# Patient Record
Sex: FEMALE | Race: WHITE | NOT HISPANIC OR LATINO | Employment: UNEMPLOYED | URBAN - METROPOLITAN AREA
[De-identification: names, ages, dates, MRNs, and addresses within clinical notes are randomized per-mention and may not be internally consistent; named-entity substitution may affect disease eponyms.]

---

## 2022-04-21 RX ORDER — CIPROFLOXACIN 500 MG/1
500 TABLET, FILM COATED ORAL 2 TIMES DAILY
COMMUNITY

## 2022-04-21 RX ORDER — IBUPROFEN 400 MG/1
400 TABLET ORAL EVERY 6 HOURS PRN
COMMUNITY

## 2022-04-21 RX ORDER — GLIPIZIDE 5 MG/1
5 TABLET ORAL
COMMUNITY

## 2022-04-21 RX ORDER — LOSARTAN POTASSIUM 50 MG/1
50 TABLET ORAL DAILY
COMMUNITY

## 2022-04-22 ENCOUNTER — APPOINTMENT (OUTPATIENT)
Dept: GENERAL RADIOLOGY | Facility: HOSPITAL | Age: 67
End: 2022-04-22

## 2022-04-22 ENCOUNTER — ANESTHESIA EVENT (OUTPATIENT)
Dept: PERIOP | Facility: HOSPITAL | Age: 67
End: 2022-04-22

## 2022-04-22 ENCOUNTER — HOSPITAL ENCOUNTER (OUTPATIENT)
Facility: HOSPITAL | Age: 67
Discharge: HOME | End: 2022-04-23
Attending: UROLOGY | Admitting: UROLOGY

## 2022-04-22 ENCOUNTER — ANESTHESIA (OUTPATIENT)
Dept: PERIOP | Facility: HOSPITAL | Age: 67
End: 2022-04-22

## 2022-04-22 DIAGNOSIS — N20.1 URETERAL STONE: ICD-10-CM

## 2022-04-22 DIAGNOSIS — N20.1 URETERAL CALCULUS, LEFT: Primary | ICD-10-CM

## 2022-04-22 PROBLEM — M54.12 CERVICAL RADICULOPATHY: Status: ACTIVE | Noted: 2021-11-12

## 2022-04-22 PROBLEM — M54.2 NECK PAIN: Status: ACTIVE | Noted: 2021-11-12

## 2022-04-22 PROBLEM — E11.9 DIABETES MELLITUS: Status: ACTIVE | Noted: 2022-04-22

## 2022-04-22 PROBLEM — S42.253A CLOSED FRACTURE OF GREATER TUBEROSITY OF HUMERUS: Status: ACTIVE | Noted: 2019-08-16

## 2022-04-22 PROBLEM — M25.519 SHOULDER PAIN: Status: ACTIVE | Noted: 2019-08-16

## 2022-04-22 PROBLEM — I10 HYPERTENSION: Status: ACTIVE | Noted: 2022-04-22

## 2022-04-22 PROBLEM — J40 BRONCHITIS: Status: ACTIVE | Noted: 2022-04-22

## 2022-04-22 LAB
ANION GAP SERPL CALCULATED.3IONS-SCNC: 11.8 MMOL/L (ref 5–15)
BASOPHILS # BLD AUTO: 0.02 10*3/MM3 (ref 0–0.2)
BASOPHILS NFR BLD AUTO: 0.4 % (ref 0–1.5)
BUN SERPL-MCNC: 11 MG/DL (ref 8–23)
BUN/CREAT SERPL: 17.5 (ref 7–25)
CALCIUM SPEC-SCNC: 9.2 MG/DL (ref 8.6–10.5)
CHLORIDE SERPL-SCNC: 106 MMOL/L (ref 98–107)
CO2 SERPL-SCNC: 23.2 MMOL/L (ref 22–29)
CREAT SERPL-MCNC: 0.63 MG/DL (ref 0.57–1)
DEPRECATED RDW RBC AUTO: 44.4 FL (ref 37–54)
EGFRCR SERPLBLD CKD-EPI 2021: 97.4 ML/MIN/1.73
EOSINOPHIL # BLD AUTO: 0.36 10*3/MM3 (ref 0–0.4)
EOSINOPHIL NFR BLD AUTO: 6.7 % (ref 0.3–6.2)
ERYTHROCYTE [DISTWIDTH] IN BLOOD BY AUTOMATED COUNT: 14.3 % (ref 12.3–15.4)
GLUCOSE SERPL-MCNC: 87 MG/DL (ref 65–99)
HCT VFR BLD AUTO: 42.1 % (ref 34–46.6)
HGB BLD-MCNC: 13.4 G/DL (ref 12–15.9)
IMM GRANULOCYTES # BLD AUTO: 0.02 10*3/MM3 (ref 0–0.05)
IMM GRANULOCYTES NFR BLD AUTO: 0.4 % (ref 0–0.5)
LYMPHOCYTES # BLD AUTO: 1.28 10*3/MM3 (ref 0.7–3.1)
LYMPHOCYTES NFR BLD AUTO: 23.8 % (ref 19.6–45.3)
MCH RBC QN AUTO: 27 PG (ref 26.6–33)
MCHC RBC AUTO-ENTMCNC: 31.8 G/DL (ref 31.5–35.7)
MCV RBC AUTO: 84.9 FL (ref 79–97)
MONOCYTES # BLD AUTO: 0.34 10*3/MM3 (ref 0.1–0.9)
MONOCYTES NFR BLD AUTO: 6.3 % (ref 5–12)
NEUTROPHILS NFR BLD AUTO: 3.35 10*3/MM3 (ref 1.7–7)
NEUTROPHILS NFR BLD AUTO: 62.4 % (ref 42.7–76)
NRBC BLD AUTO-RTO: 0 /100 WBC (ref 0–0.2)
PLATELET # BLD AUTO: 148 10*3/MM3 (ref 140–450)
PMV BLD AUTO: 12.3 FL (ref 6–12)
POTASSIUM SERPL-SCNC: 4.2 MMOL/L (ref 3.5–5.2)
RBC # BLD AUTO: 4.96 10*6/MM3 (ref 3.77–5.28)
SARS-COV-2 RNA PNL SPEC NAA+PROBE: NOT DETECTED
SODIUM SERPL-SCNC: 141 MMOL/L (ref 136–145)
WBC NRBC COR # BLD: 5.37 10*3/MM3 (ref 3.4–10.8)

## 2022-04-22 PROCEDURE — C2617 STENT, NON-COR, TEM W/O DEL: HCPCS | Performed by: UROLOGY

## 2022-04-22 PROCEDURE — 74420 UROGRAPHY RTRGR +-KUB: CPT

## 2022-04-22 PROCEDURE — C9803 HOPD COVID-19 SPEC COLLECT: HCPCS

## 2022-04-22 PROCEDURE — 25010000002 CEFAZOLIN IN DEXTROSE 2-4 GM/100ML-% SOLUTION: Performed by: UROLOGY

## 2022-04-22 PROCEDURE — C1769 GUIDE WIRE: HCPCS | Performed by: UROLOGY

## 2022-04-22 PROCEDURE — 25010000002 HYDROMORPHONE PER 4 MG: Performed by: ANESTHESIOLOGY

## 2022-04-22 PROCEDURE — G0378 HOSPITAL OBSERVATION PER HR: HCPCS

## 2022-04-22 PROCEDURE — 0 IOTHALAMATE 60 % SOLUTION: Performed by: UROLOGY

## 2022-04-22 PROCEDURE — 82365 CALCULUS SPECTROSCOPY: CPT | Performed by: UROLOGY

## 2022-04-22 PROCEDURE — 25010000002 FENTANYL CITRATE (PF) 50 MCG/ML SOLUTION: Performed by: ANESTHESIOLOGY

## 2022-04-22 PROCEDURE — C1758 CATHETER, URETERAL: HCPCS | Performed by: UROLOGY

## 2022-04-22 PROCEDURE — 25010000002 PROPOFOL 10 MG/ML EMULSION: Performed by: ANESTHESIOLOGY

## 2022-04-22 PROCEDURE — 25010000002 KETOROLAC TROMETHAMINE PER 15 MG: Performed by: ANESTHESIOLOGY

## 2022-04-22 PROCEDURE — 87635 SARS-COV-2 COVID-19 AMP PRB: CPT | Performed by: UROLOGY

## 2022-04-22 PROCEDURE — 25010000002 MIDAZOLAM PER 1 MG: Performed by: ANESTHESIOLOGY

## 2022-04-22 PROCEDURE — 85025 COMPLETE CBC W/AUTO DIFF WBC: CPT | Performed by: UROLOGY

## 2022-04-22 PROCEDURE — 80048 BASIC METABOLIC PNL TOTAL CA: CPT | Performed by: UROLOGY

## 2022-04-22 DEVICE — URETERAL STENT
Type: IMPLANTABLE DEVICE | Site: URETER | Status: FUNCTIONAL
Brand: CONTOUR™

## 2022-04-22 RX ORDER — ONDANSETRON 4 MG/1
4 TABLET, FILM COATED ORAL EVERY 6 HOURS PRN
Status: DISCONTINUED | OUTPATIENT
Start: 2022-04-22 | End: 2022-04-23 | Stop reason: HOSPADM

## 2022-04-22 RX ORDER — LABETALOL HYDROCHLORIDE 5 MG/ML
5 INJECTION, SOLUTION INTRAVENOUS
Status: DISCONTINUED | OUTPATIENT
Start: 2022-04-22 | End: 2022-04-22 | Stop reason: HOSPADM

## 2022-04-22 RX ORDER — NALOXONE HCL 0.4 MG/ML
0.1 VIAL (ML) INJECTION
Status: DISCONTINUED | OUTPATIENT
Start: 2022-04-22 | End: 2022-04-23 | Stop reason: HOSPADM

## 2022-04-22 RX ORDER — EPHEDRINE SULFATE 50 MG/ML
5 INJECTION, SOLUTION INTRAVENOUS ONCE AS NEEDED
Status: DISCONTINUED | OUTPATIENT
Start: 2022-04-22 | End: 2022-04-22 | Stop reason: HOSPADM

## 2022-04-22 RX ORDER — OXYCODONE HYDROCHLORIDE AND ACETAMINOPHEN 5; 325 MG/1; MG/1
1 TABLET ORAL EVERY 4 HOURS PRN
Qty: 20 TABLET | Refills: 0 | Status: SHIPPED | OUTPATIENT
Start: 2022-04-22

## 2022-04-22 RX ORDER — SODIUM CHLORIDE 0.9 % (FLUSH) 0.9 %
3 SYRINGE (ML) INJECTION EVERY 12 HOURS SCHEDULED
Status: DISCONTINUED | OUTPATIENT
Start: 2022-04-22 | End: 2022-04-22 | Stop reason: HOSPADM

## 2022-04-22 RX ORDER — HYDROCODONE BITARTRATE AND ACETAMINOPHEN 7.5; 325 MG/1; MG/1
1 TABLET ORAL ONCE AS NEEDED
Status: DISCONTINUED | OUTPATIENT
Start: 2022-04-22 | End: 2022-04-22 | Stop reason: HOSPADM

## 2022-04-22 RX ORDER — DIPHENHYDRAMINE HYDROCHLORIDE 50 MG/ML
12.5 INJECTION INTRAMUSCULAR; INTRAVENOUS
Status: DISCONTINUED | OUTPATIENT
Start: 2022-04-22 | End: 2022-04-22 | Stop reason: HOSPADM

## 2022-04-22 RX ORDER — LEVOFLOXACIN 750 MG/1
750 TABLET ORAL
Status: DISCONTINUED | OUTPATIENT
Start: 2022-04-23 | End: 2022-04-23 | Stop reason: HOSPADM

## 2022-04-22 RX ORDER — SODIUM CHLORIDE 0.9 % (FLUSH) 0.9 %
3 SYRINGE (ML) INJECTION EVERY 12 HOURS SCHEDULED
Status: DISCONTINUED | OUTPATIENT
Start: 2022-04-22 | End: 2022-04-23 | Stop reason: HOSPADM

## 2022-04-22 RX ORDER — MIDAZOLAM HYDROCHLORIDE 1 MG/ML
0.5 INJECTION INTRAMUSCULAR; INTRAVENOUS
Status: COMPLETED | OUTPATIENT
Start: 2022-04-22 | End: 2022-04-22

## 2022-04-22 RX ORDER — DIPHENHYDRAMINE HCL 25 MG
25 CAPSULE ORAL
Status: DISCONTINUED | OUTPATIENT
Start: 2022-04-22 | End: 2022-04-22 | Stop reason: HOSPADM

## 2022-04-22 RX ORDER — GLIPIZIDE 5 MG/1
5 TABLET ORAL
Status: DISCONTINUED | OUTPATIENT
Start: 2022-04-23 | End: 2022-04-23 | Stop reason: HOSPADM

## 2022-04-22 RX ORDER — MAGNESIUM HYDROXIDE 1200 MG/15ML
LIQUID ORAL AS NEEDED
Status: DISCONTINUED | OUTPATIENT
Start: 2022-04-22 | End: 2022-04-22 | Stop reason: HOSPADM

## 2022-04-22 RX ORDER — PROMETHAZINE HYDROCHLORIDE 25 MG/1
25 SUPPOSITORY RECTAL ONCE AS NEEDED
Status: DISCONTINUED | OUTPATIENT
Start: 2022-04-22 | End: 2022-04-22 | Stop reason: HOSPADM

## 2022-04-22 RX ORDER — OXYCODONE HYDROCHLORIDE AND ACETAMINOPHEN 5; 325 MG/1; MG/1
1 TABLET ORAL EVERY 4 HOURS PRN
Status: DISCONTINUED | OUTPATIENT
Start: 2022-04-22 | End: 2022-04-23 | Stop reason: HOSPADM

## 2022-04-22 RX ORDER — PROMETHAZINE HYDROCHLORIDE 25 MG/1
25 TABLET ORAL ONCE AS NEEDED
Status: DISCONTINUED | OUTPATIENT
Start: 2022-04-22 | End: 2022-04-22 | Stop reason: HOSPADM

## 2022-04-22 RX ORDER — SODIUM CHLORIDE 0.9 % (FLUSH) 0.9 %
3-10 SYRINGE (ML) INJECTION AS NEEDED
Status: DISCONTINUED | OUTPATIENT
Start: 2022-04-22 | End: 2022-04-22 | Stop reason: HOSPADM

## 2022-04-22 RX ORDER — ROCURONIUM BROMIDE 10 MG/ML
INJECTION, SOLUTION INTRAVENOUS AS NEEDED
Status: DISCONTINUED | OUTPATIENT
Start: 2022-04-22 | End: 2022-04-22 | Stop reason: SURG

## 2022-04-22 RX ORDER — FENTANYL CITRATE 50 UG/ML
50 INJECTION, SOLUTION INTRAMUSCULAR; INTRAVENOUS
Status: DISCONTINUED | OUTPATIENT
Start: 2022-04-22 | End: 2022-04-22 | Stop reason: HOSPADM

## 2022-04-22 RX ORDER — SODIUM CHLORIDE 0.9 % (FLUSH) 0.9 %
10 SYRINGE (ML) INJECTION AS NEEDED
Status: DISCONTINUED | OUTPATIENT
Start: 2022-04-22 | End: 2022-04-23 | Stop reason: HOSPADM

## 2022-04-22 RX ORDER — NALOXONE HCL 0.4 MG/ML
0.2 VIAL (ML) INJECTION AS NEEDED
Status: DISCONTINUED | OUTPATIENT
Start: 2022-04-22 | End: 2022-04-22 | Stop reason: HOSPADM

## 2022-04-22 RX ORDER — LOSARTAN POTASSIUM 50 MG/1
50 TABLET ORAL DAILY
Status: DISCONTINUED | OUTPATIENT
Start: 2022-04-23 | End: 2022-04-23 | Stop reason: HOSPADM

## 2022-04-22 RX ORDER — SODIUM CHLORIDE 9 MG/ML
50 INJECTION, SOLUTION INTRAVENOUS CONTINUOUS
Status: DISCONTINUED | OUTPATIENT
Start: 2022-04-22 | End: 2022-04-23 | Stop reason: HOSPADM

## 2022-04-22 RX ORDER — OXYCODONE AND ACETAMINOPHEN 7.5; 325 MG/1; MG/1
1 TABLET ORAL EVERY 4 HOURS PRN
Status: DISCONTINUED | OUTPATIENT
Start: 2022-04-22 | End: 2022-04-22 | Stop reason: HOSPADM

## 2022-04-22 RX ORDER — HYDROMORPHONE HYDROCHLORIDE 1 MG/ML
0.5 INJECTION, SOLUTION INTRAMUSCULAR; INTRAVENOUS; SUBCUTANEOUS
Status: DISCONTINUED | OUTPATIENT
Start: 2022-04-22 | End: 2022-04-22 | Stop reason: HOSPADM

## 2022-04-22 RX ORDER — CEFAZOLIN SODIUM 2 G/100ML
2 INJECTION, SOLUTION INTRAVENOUS ONCE
Status: COMPLETED | OUTPATIENT
Start: 2022-04-22 | End: 2022-04-22

## 2022-04-22 RX ORDER — HYDROMORPHONE HYDROCHLORIDE 1 MG/ML
0.5 INJECTION, SOLUTION INTRAMUSCULAR; INTRAVENOUS; SUBCUTANEOUS
Status: DISCONTINUED | OUTPATIENT
Start: 2022-04-22 | End: 2022-04-23 | Stop reason: HOSPADM

## 2022-04-22 RX ORDER — FAMOTIDINE 10 MG/ML
20 INJECTION, SOLUTION INTRAVENOUS ONCE
Status: COMPLETED | OUTPATIENT
Start: 2022-04-22 | End: 2022-04-22

## 2022-04-22 RX ORDER — SODIUM CHLORIDE, SODIUM LACTATE, POTASSIUM CHLORIDE, CALCIUM CHLORIDE 600; 310; 30; 20 MG/100ML; MG/100ML; MG/100ML; MG/100ML
9 INJECTION, SOLUTION INTRAVENOUS CONTINUOUS
Status: DISCONTINUED | OUTPATIENT
Start: 2022-04-22 | End: 2022-04-22

## 2022-04-22 RX ORDER — IBUPROFEN 600 MG/1
600 TABLET ORAL ONCE AS NEEDED
Status: DISCONTINUED | OUTPATIENT
Start: 2022-04-22 | End: 2022-04-22 | Stop reason: HOSPADM

## 2022-04-22 RX ORDER — LIDOCAINE HYDROCHLORIDE 20 MG/ML
INJECTION, SOLUTION INFILTRATION; PERINEURAL AS NEEDED
Status: DISCONTINUED | OUTPATIENT
Start: 2022-04-22 | End: 2022-04-22 | Stop reason: SURG

## 2022-04-22 RX ORDER — LIDOCAINE HYDROCHLORIDE 10 MG/ML
0.5 INJECTION, SOLUTION EPIDURAL; INFILTRATION; INTRACAUDAL; PERINEURAL ONCE AS NEEDED
Status: DISCONTINUED | OUTPATIENT
Start: 2022-04-22 | End: 2022-04-22 | Stop reason: HOSPADM

## 2022-04-22 RX ORDER — PROPOFOL 10 MG/ML
VIAL (ML) INTRAVENOUS AS NEEDED
Status: DISCONTINUED | OUTPATIENT
Start: 2022-04-22 | End: 2022-04-22 | Stop reason: SURG

## 2022-04-22 RX ORDER — ONDANSETRON 2 MG/ML
4 INJECTION INTRAMUSCULAR; INTRAVENOUS EVERY 6 HOURS PRN
Status: DISCONTINUED | OUTPATIENT
Start: 2022-04-22 | End: 2022-04-23 | Stop reason: HOSPADM

## 2022-04-22 RX ORDER — OXYCODONE HYDROCHLORIDE AND ACETAMINOPHEN 5; 325 MG/1; MG/1
1 TABLET ORAL ONCE AS NEEDED
Status: DISCONTINUED | OUTPATIENT
Start: 2022-04-22 | End: 2022-04-22 | Stop reason: HOSPADM

## 2022-04-22 RX ORDER — ONDANSETRON 2 MG/ML
4 INJECTION INTRAMUSCULAR; INTRAVENOUS ONCE AS NEEDED
Status: DISCONTINUED | OUTPATIENT
Start: 2022-04-22 | End: 2022-04-22 | Stop reason: HOSPADM

## 2022-04-22 RX ORDER — PHENAZOPYRIDINE HYDROCHLORIDE 200 MG/1
200 TABLET, FILM COATED ORAL 3 TIMES DAILY PRN
Qty: 30 TABLET | Refills: 0 | Status: SHIPPED | OUTPATIENT
Start: 2022-04-22

## 2022-04-22 RX ORDER — HYDRALAZINE HYDROCHLORIDE 20 MG/ML
5 INJECTION INTRAMUSCULAR; INTRAVENOUS
Status: DISCONTINUED | OUTPATIENT
Start: 2022-04-22 | End: 2022-04-22 | Stop reason: HOSPADM

## 2022-04-22 RX ORDER — FLUMAZENIL 0.1 MG/ML
0.2 INJECTION INTRAVENOUS AS NEEDED
Status: DISCONTINUED | OUTPATIENT
Start: 2022-04-22 | End: 2022-04-22 | Stop reason: HOSPADM

## 2022-04-22 RX ORDER — KETOROLAC TROMETHAMINE 15 MG/ML
15 INJECTION, SOLUTION INTRAMUSCULAR; INTRAVENOUS ONCE
Status: COMPLETED | OUTPATIENT
Start: 2022-04-22 | End: 2022-04-22

## 2022-04-22 RX ADMIN — KETOROLAC TROMETHAMINE 15 MG: 15 INJECTION, SOLUTION INTRAMUSCULAR; INTRAVENOUS at 20:30

## 2022-04-22 RX ADMIN — FAMOTIDINE 20 MG: 10 INJECTION INTRAVENOUS at 15:54

## 2022-04-22 RX ADMIN — HYDROMORPHONE HYDROCHLORIDE 0.5 MG: 1 INJECTION, SOLUTION INTRAMUSCULAR; INTRAVENOUS; SUBCUTANEOUS at 19:13

## 2022-04-22 RX ADMIN — SODIUM CHLORIDE, POTASSIUM CHLORIDE, SODIUM LACTATE AND CALCIUM CHLORIDE 9 ML/HR: 600; 310; 30; 20 INJECTION, SOLUTION INTRAVENOUS at 15:54

## 2022-04-22 RX ADMIN — CEFAZOLIN SODIUM 2 G: 2 INJECTION, SOLUTION INTRAVENOUS at 17:54

## 2022-04-22 RX ADMIN — FENTANYL CITRATE 50 MCG: 50 INJECTION, SOLUTION INTRAMUSCULAR; INTRAVENOUS at 18:49

## 2022-04-22 RX ADMIN — ROCURONIUM BROMIDE 5 MG: 50 INJECTION INTRAVENOUS at 18:05

## 2022-04-22 RX ADMIN — FENTANYL CITRATE 50 MCG: 50 INJECTION, SOLUTION INTRAMUSCULAR; INTRAVENOUS at 19:22

## 2022-04-22 RX ADMIN — MIDAZOLAM 0.5 MG: 1 INJECTION INTRAMUSCULAR; INTRAVENOUS at 17:24

## 2022-04-22 RX ADMIN — OXYCODONE AND ACETAMINOPHEN 1 TABLET: 5; 325 TABLET ORAL at 19:13

## 2022-04-22 RX ADMIN — PROPOFOL 200 MG: 10 INJECTION, EMULSION INTRAVENOUS at 18:06

## 2022-04-22 RX ADMIN — MIDAZOLAM 0.5 MG: 1 INJECTION INTRAMUSCULAR; INTRAVENOUS at 15:54

## 2022-04-22 RX ADMIN — LIDOCAINE HYDROCHLORIDE 100 MG: 20 INJECTION, SOLUTION INFILTRATION; PERINEURAL at 18:06

## 2022-04-22 NOTE — ANESTHESIA PROCEDURE NOTES
Airway  Urgency: elective    Date/Time: 4/22/2022 6:09 PM  End Time:4/22/2022 6:09 PM  Airway not difficult    General Information and Staff    Patient location during procedure: OR  Anesthesiologist: Wilbert Sloan MD    Indications and Patient Condition  Indications for airway management: airway protection    Preoxygenated: yes  Mask difficulty assessment: 0 - not attempted    Final Airway Details  Final airway type: supraglottic airway      Successful airway: classic  Size 4    Number of attempts at approach: 2  Assessment: lips, teeth, and gum same as pre-op and atraumatic intubation

## 2022-04-22 NOTE — ANESTHESIA PREPROCEDURE EVALUATION
Anesthesia Evaluation     Patient summary reviewed and Nursing notes reviewed   no history of anesthetic complications:               Airway   Mallampati: II  TM distance: >3 FB  Neck ROM: full  no difficulty expected  Dental - normal exam     Pulmonary     breath sounds clear to auscultation  (+) asthma,  (-) shortness of breath, sleep apnea, decreased breath sounds, wheezes  Cardiovascular - normal exam  Exercise tolerance: good (4-7 METS)    Rhythm: regular  Rate: normal    (+) hypertension,   (-) past MI, angina, CHF, orthopnea, PND, SEPULVEDA, PVD      Neuro/Psych  (+) psychiatric history Anxiety,    (-) seizures, neuromuscular disease, TIA, CVA, dizziness/light headedness, weakness, numbness  GI/Hepatic/Renal/Endo    (+) obesity,   liver disease fatty liver disease cirrhosis, diabetes mellitus,     Musculoskeletal     (+) back pain,   Abdominal  - normal exam   Substance History - negative use  (-) alcohol use, drug use     OB/GYN negative ob/gyn ROS         Other - negative ROS                       Anesthesia Plan    ASA 4 - emergent     general   (I discussed with the patient the relevant risks of general anesthesia including, but not limited to, nausea, vomiting, disorientation, post-op delirium, nerve injury, oral/dental injury, awareness, stroke, and death.  I also reviewed any clinically relevant lab and imaging results.)  intravenous induction     Anesthetic plan, all risks, benefits, and alternatives have been provided, discussed and informed consent has been obtained with: patient.    Plan discussed with CRNA.        CODE STATUS:

## 2022-04-23 VITALS
OXYGEN SATURATION: 92 % | WEIGHT: 179.01 LBS | TEMPERATURE: 98.5 F | DIASTOLIC BLOOD PRESSURE: 60 MMHG | RESPIRATION RATE: 16 BRPM | HEART RATE: 66 BPM | HEIGHT: 63 IN | SYSTOLIC BLOOD PRESSURE: 102 MMHG | BODY MASS INDEX: 31.72 KG/M2

## 2022-04-23 PROBLEM — N20.1 URETERAL CALCULUS, LEFT: Status: RESOLVED | Noted: 2022-04-22 | Resolved: 2022-04-23

## 2022-04-23 LAB — GLUCOSE BLDC GLUCOMTR-MCNC: 174 MG/DL (ref 70–130)

## 2022-04-23 PROCEDURE — A9270 NON-COVERED ITEM OR SERVICE: HCPCS | Performed by: UROLOGY

## 2022-04-23 PROCEDURE — 63710000001 OXYCODONE-ACETAMINOPHEN 5-325 MG TABLET: Performed by: UROLOGY

## 2022-04-23 PROCEDURE — 63710000001 LOSARTAN 50 MG TABLET: Performed by: UROLOGY

## 2022-04-23 PROCEDURE — 82962 GLUCOSE BLOOD TEST: CPT

## 2022-04-23 PROCEDURE — G0378 HOSPITAL OBSERVATION PER HR: HCPCS

## 2022-04-23 PROCEDURE — 63710000001 LEVOFLOXACIN 750 MG TABLET: Performed by: UROLOGY

## 2022-04-23 PROCEDURE — 25010000002 HYDROMORPHONE PER 4 MG: Performed by: UROLOGY

## 2022-04-23 PROCEDURE — 63710000001 PHENAZOPYRIDINE 200 MG TABLET: Performed by: UROLOGY

## 2022-04-23 PROCEDURE — 63710000001 GLIPIZIDE 5 MG TABLET: Performed by: UROLOGY

## 2022-04-23 RX ORDER — PHENAZOPYRIDINE HYDROCHLORIDE 200 MG/1
200 TABLET, FILM COATED ORAL ONCE
Status: COMPLETED | OUTPATIENT
Start: 2022-04-23 | End: 2022-04-23

## 2022-04-23 RX ADMIN — OXYCODONE AND ACETAMINOPHEN 1 TABLET: 5; 325 TABLET ORAL at 05:39

## 2022-04-23 RX ADMIN — HYDROMORPHONE HYDROCHLORIDE 0.5 MG: 1 INJECTION, SOLUTION INTRAMUSCULAR; INTRAVENOUS; SUBCUTANEOUS at 01:08

## 2022-04-23 RX ADMIN — SODIUM CHLORIDE 50 ML/HR: 9 INJECTION, SOLUTION INTRAVENOUS at 01:08

## 2022-04-23 RX ADMIN — LEVOFLOXACIN 750 MG: 750 TABLET, FILM COATED ORAL at 08:19

## 2022-04-23 RX ADMIN — PHENAZOPYRIDINE 200 MG: 200 TABLET ORAL at 07:22

## 2022-04-23 RX ADMIN — GLIPIZIDE 5 MG: 5 TABLET ORAL at 07:23

## 2022-04-23 RX ADMIN — LOSARTAN POTASSIUM 50 MG: 50 TABLET, FILM COATED ORAL at 09:39

## 2022-04-23 NOTE — ANESTHESIA POSTPROCEDURE EVALUATION
"Patient: Cristiana Headley    Procedure Summary     Date: 04/22/22 Room / Location: [facility] OR  / [facility] MAIN OR    Anesthesia Start: 1802 Anesthesia Stop: 1842    Procedure: CYSTOSCOPY, LEFT URETEROSCOPY WITH RETROGRADE PYELOGRAM, STONE BASKET EXTRACTION AND STENT PLACEMENT (Left ) Diagnosis:       Ureteral calculus      (Left Ureteral Stone)    Surgeons: Wilbert Flowers MD Provider: Wilbert Sloan MD    Anesthesia Type: general ASA Status: 4 - Emergent          Anesthesia Type: general    Vitals  Vitals Value Taken Time   /70 04/22/22 1926   Temp 36.7 °C (98 °F) 04/22/22 1925   Pulse 65 04/22/22 1928   Resp 16 04/22/22 1925   SpO2 92 % 04/22/22 1929   Vitals shown include unvalidated device data.        Post Anesthesia Care and Evaluation    Patient location during evaluation: PACU  Patient participation: complete - patient participated  Level of consciousness: awake  Pain management: adequate  Airway patency: patent  Anesthetic complications: No anesthetic complications    Cardiovascular status: acceptable  Respiratory status: acceptable  Hydration status: acceptable    Comments: /62   Pulse 69   Temp 36.7 °C (98 °F) (Oral)   Resp 16   Ht 158.8 cm (62.5\")   Wt 81.2 kg (179 lb 0.2 oz)   SpO2 100%   BMI 32.22 kg/m²       "

## 2022-04-28 LAB
CALCIUM OXALATE DIHYDRATE MFR STONE IR: 60 %
COLOR STONE: NORMAL
COM MFR STONE: 40 %
COMPN STONE: NORMAL
LABORATORY COMMENT REPORT: NORMAL
LABORATORY COMMENT REPORT: NORMAL
Lab: NORMAL
Lab: NORMAL
PHOTO: NORMAL
SIZE STONE: NORMAL MM
SPEC SOURCE SUBJ: NORMAL
WT STONE: 14 MG

## 2023-09-06 ENCOUNTER — TELEPHONE (OUTPATIENT)
Dept: FAMILY MEDICINE CLINIC | Facility: CLINIC | Age: 68
End: 2023-09-06

## 2023-09-06 NOTE — TELEPHONE ENCOUNTER
PATIENT CALLED STATING THAT SHE WAS REFERRED TO DR. HALE BY DR. HAI VITALE.    HUB STAFF INFORMED HER THAT DR. HALE IS CURRENTLY NOT ACCEPTING NEW PATIENTS, BUT SHE WOULD LIKE TO SEE IF HE WOULD BE WILLING TO MAKE AN EXCEPTION FOR HER AND HER MOTHER.    DR. VITALE SPOKE VERY HIGHLY OF DR. HALE, STATING THAT HE WOULD BE THE PERFECT PRIMARY CARE PROVIDER FOR HER, AND SHE IS VERY EXCITED FOR HER AND HER MOTHER TO SEE HIM.    PLEASE ADVISE  [phone]

## 2023-09-06 NOTE — TELEPHONE ENCOUNTER
Dr. Headley is currently not accepting new patients . Dr. Gabriel and Dr. Morrison are taking new pt if they would like to be seen in this office    Hub to read

## 2023-10-03 NOTE — PROGRESS NOTES
"Chief Complaint  Establish Care and Diabetes    Subjective        HPI   michael presents to [facility] for a new patient visit. She received care in [place] up until recently. 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    Chest pain: Had an episode that last week. Happened when she was lying down. Substernal, in the middle, pressure-like sensation, lasted about a minute. Got up, drank some water, took antacid. No diaphoresis. Got palpitations after COVID vaccine booster. Saw a cardiologist in [place] previously, was told to avoid COVID vaccines for now. Had a stress test two years ago, was normal, per pt.     DM2: Since early 50s. Gained 20 lbs as of late, admits to eating a \"horrible\" diet. Used to be controlled, unsure of what A1c is now. Takes Glipizide 5 mg ER bid and Metformin 1000 mg bid. Needs eye doc.    Takes Losartan for BP once daily    Kidney stones: Follows with Dr. Flowers at [facility]    Gyn Care: Does not have a gynecologist. Reports she had some abnormal mammograms, saw oncologist and breast surgeon but all is good now    She has Dupuytren's contracture, follows with Dr. Troy. Has been getting injections, doing great.    States she has a \"huge\" liver mass, has had since in her 30s, has been growing, occ gets RUQ pains. States she almost had a liver surgery and was under evaluation for liver transplant but that no one wanted to operate on her. She also reports she was told she had cirrhosis. Avoids APAP, takes ibuprofen for pain. Doesn't drink ETOH.  Got care at [facility], Dr. Flores ([phone]) for this.    Needs C-scope, has hx colon polyps and believes she's due for repeat.    Believes she's mostly UTD on vaccines, but is holding off on COVID vaccine for now per recommendation from prior cardiologist.    She had a chronic cough that she mentioned at end of the visit, did not have time to address.       Objective   Vital Signs:   Vitals:    10/11/23 1107 " "  BP: 134/66   BP Location: Right arm   Patient Position: Sitting   Cuff Size: Large Adult   Pulse: 100   SpO2: 98%   Weight: 86.8 kg (191 lb 4.8 oz)   Height: 158.8 cm (62.5\")                Physical Exam  Constitutional:       General: She is not in acute distress.     Appearance: Normal appearance. She is obese. She is not toxic-appearing.   HENT:      Head: Normocephalic and atraumatic.      Mouth/Throat:      Mouth: Mucous membranes are moist.   Eyes:      General: No scleral icterus.     Conjunctiva/sclera: Conjunctivae normal.   Cardiovascular:      Rate and Rhythm: Normal rate and regular rhythm.      Heart sounds: Normal heart sounds. No murmur heard.     No friction rub. No gallop.   Pulmonary:      Effort: Pulmonary effort is normal. No respiratory distress.      Breath sounds: Normal breath sounds.   Abdominal:      General: There is no distension.      Palpations: Abdomen is soft.      Tenderness: There is no abdominal tenderness.   Musculoskeletal:         General: No swelling, tenderness or deformity. Normal range of motion.      Cervical back: Normal range of motion and neck supple.   Skin:     General: Skin is warm and dry.      Findings: No lesion or rash.   Neurological:      General: No focal deficit present.      Mental Status: She is alert and oriented to person, place, and time.   Psychiatric:         Mood and Affect: Mood normal.         Behavior: Behavior normal.         Judgment: Judgment normal.          Result Review :     The following data was reviewed by: Sabra Guadarrama MD on 10/11/2023:  Discharge Summary by Cecile Klein APRN (04/23/2022 13:01)          Assessment and Plan    Diagnoses and all orders for this visit:    1. Type 2 diabetes mellitus without complication, without long-term current use of insulin (Primary)  Assessment & Plan:  A1c: today, further rx depends on A1c  Dilated Eye Exam: Due, referred to ophtho  Urine microalbumin: today  On ACE/ARB? Yes  Statin: no, " "checking lipid panel, needs statin  Pneumococcal vaccination: Pt believes UTD        Orders:  -     Lipid Panel  -     Microalbumin / Creatinine Urine Ratio - Urine, Clean Catch  -     Hemoglobin A1c  -     Vitamin B12  -     Ambulatory Referral to Ophthalmology    2. Encounter for hepatitis C screening test for low risk patient  -     Hepatitis C Antibody    3. Primary hypertension  Assessment & Plan:  Slightly high, check next visit. Check renal function and K, con't Losartan.    Orders:  -     Comprehensive Metabolic Panel    4. Routine health maintenance  Assessment & Plan:  Did not have time to address today  Obtain records from prior PCP    Orders:  -     CBC (No Diff)    5. Chest pain, unspecified type  Assessment & Plan:  Atypical but with typical feature of \"pressure\" sensation. Pt with CAD RF including obesity, DM2, HTN. EKG NSR, no ischemia, normal intervals. Rate 83, low voltage, normal axis. Stable compared to EKG from 4/2022. Refer to cardiology for evaluation. Also ?hx A fib. Need records.     Orders:  -     ECG 12 Lead  -     Ambulatory Referral to Cardiology    6. Hepatic cirrhosis, unspecified hepatic cirrhosis type, unspecified whether ascites present  Assessment & Plan:  Per pt report. Also with reported liver mass. Obtain GI records.     Orders:  -     Hepatitis C Antibody  -     Ambulatory Referral to Gastroenterology    7. Encounter for screening mammogram for malignant neoplasm of breast  -     Mammo screening digital tomosynthesis bilateral w CAD; Future    8. Polyp of colon, unspecified part of colon, unspecified type  Assessment & Plan:  Obtain records from prior GI. Refer to GI, pt believes she is due for repeat C-scope.    Orders:  -     Ambulatory Referral to Gastroenterology    9. Liver mass  Assessment & Plan:  Pt relates that she has a \"large\" liver mass but is unsure what it is. She also states she has cirrhosis and that at one point, she was being evaluated for liver " surgery/transplant. Obtain records.     Orders:  -     Ambulatory Referral to Gastroenterology    10. Chronic cough  Assessment & Plan:  Pt mentioned at end of visit, did not have time to address. Address in future visits.       11. Obesity (BMI 30-39.9)    12. Kidney stones  Assessment & Plan:  Follows with Dr. Flowers at [facility].      Pt has received care at multiple institutions in [place]. Asked her if she could bring what medical records she has and we will also request, especially GI records.      Follow Up   Return in about 4 weeks (around 11/8/2023) for Recheck, Next scheduled follow up.  Patient was given instructions and counseling regarding her condition or for health maintenance advice. Please see specific information pulled into the AVS if appropriate.

## 2023-10-11 ENCOUNTER — OFFICE VISIT (OUTPATIENT)
Dept: FAMILY MEDICINE CLINIC | Facility: CLINIC | Age: 68
End: 2023-10-11
Payer: MEDICARE

## 2023-10-11 VITALS
HEART RATE: 100 BPM | HEIGHT: 63 IN | BODY MASS INDEX: 33.89 KG/M2 | SYSTOLIC BLOOD PRESSURE: 134 MMHG | WEIGHT: 191.3 LBS | OXYGEN SATURATION: 98 % | DIASTOLIC BLOOD PRESSURE: 66 MMHG

## 2023-10-11 DIAGNOSIS — Z00.00 ROUTINE HEALTH MAINTENANCE: ICD-10-CM

## 2023-10-11 DIAGNOSIS — N20.0 KIDNEY STONES: ICD-10-CM

## 2023-10-11 DIAGNOSIS — E11.9 TYPE 2 DIABETES MELLITUS WITHOUT COMPLICATION, WITHOUT LONG-TERM CURRENT USE OF INSULIN: Primary | ICD-10-CM

## 2023-10-11 DIAGNOSIS — Z12.31 ENCOUNTER FOR SCREENING MAMMOGRAM FOR MALIGNANT NEOPLASM OF BREAST: ICD-10-CM

## 2023-10-11 DIAGNOSIS — K63.5 POLYP OF COLON, UNSPECIFIED PART OF COLON, UNSPECIFIED TYPE: ICD-10-CM

## 2023-10-11 DIAGNOSIS — Z11.59 ENCOUNTER FOR HEPATITIS C SCREENING TEST FOR LOW RISK PATIENT: ICD-10-CM

## 2023-10-11 DIAGNOSIS — I10 PRIMARY HYPERTENSION: ICD-10-CM

## 2023-10-11 DIAGNOSIS — K74.60 HEPATIC CIRRHOSIS, UNSPECIFIED HEPATIC CIRRHOSIS TYPE, UNSPECIFIED WHETHER ASCITES PRESENT: ICD-10-CM

## 2023-10-11 DIAGNOSIS — R05.3 CHRONIC COUGH: ICD-10-CM

## 2023-10-11 DIAGNOSIS — E66.9 OBESITY (BMI 30-39.9): ICD-10-CM

## 2023-10-11 DIAGNOSIS — R07.9 CHEST PAIN, UNSPECIFIED TYPE: ICD-10-CM

## 2023-10-11 DIAGNOSIS — R16.0 LIVER MASS: ICD-10-CM

## 2023-10-11 PROBLEM — M72.0 DUPUYTREN'S CONTRACTURE: Status: ACTIVE | Noted: 2023-10-11

## 2023-10-11 PROBLEM — J40 BRONCHITIS: Status: RESOLVED | Noted: 2022-04-22 | Resolved: 2023-10-11

## 2023-10-11 NOTE — ASSESSMENT & PLAN NOTE
"Atypical but with typical feature of \"pressure\" sensation. Pt with CAD RF including obesity, DM2, HTN. EKG NSR, no ischemia, normal intervals. Rate 83, low voltage, normal axis. Stable compared to EKG from 4/2022. Refer to cardiology for evaluation. Also ?hx A fib. Need records.   "

## 2023-10-11 NOTE — ASSESSMENT & PLAN NOTE
"Pt relates that she has a \"large\" liver mass but is unsure what it is. She also states she has cirrhosis and that at one point, she was being evaluated for liver surgery/transplant. Obtain records.   "

## 2023-10-11 NOTE — ASSESSMENT & PLAN NOTE
A1c: today, further rx depends on A1c  Dilated Eye Exam: Due, referred to ophtho  Urine microalbumin: today  On ACE/ARB? Yes  Statin: no, checking lipid panel, needs statin  Pneumococcal vaccination: Pt believes UTD

## 2023-10-12 LAB
ALBUMIN SERPL-MCNC: 5 G/DL (ref 3.5–5.2)
ALBUMIN/GLOB SERPL: 1.7 G/DL
ALP SERPL-CCNC: 239 U/L (ref 39–117)
ALT SERPL-CCNC: 70 U/L (ref 1–33)
AST SERPL-CCNC: 80 U/L (ref 1–32)
BILIRUB SERPL-MCNC: 0.6 MG/DL (ref 0–1.2)
BUN SERPL-MCNC: 10 MG/DL (ref 8–23)
BUN/CREAT SERPL: 15.4 (ref 7–25)
CALCIUM SERPL-MCNC: 10.2 MG/DL (ref 8.6–10.5)
CHLORIDE SERPL-SCNC: 99 MMOL/L (ref 98–107)
CHOLEST SERPL-MCNC: 220 MG/DL (ref 0–200)
CO2 SERPL-SCNC: 24.9 MMOL/L (ref 22–29)
CREAT SERPL-MCNC: 0.65 MG/DL (ref 0.57–1)
EGFRCR SERPLBLD CKD-EPI 2021: 95.4 ML/MIN/1.73
ERYTHROCYTE [DISTWIDTH] IN BLOOD BY AUTOMATED COUNT: 13.4 % (ref 12.3–15.4)
GLOBULIN SER CALC-MCNC: 2.9 GM/DL
GLUCOSE SERPL-MCNC: 274 MG/DL (ref 65–99)
HBA1C MFR BLD: 9.2 % (ref 4.8–5.6)
HCT VFR BLD AUTO: 43.8 % (ref 34–46.6)
HCV IGG SERPL QL IA: NON REACTIVE
HDLC SERPL-MCNC: 63 MG/DL (ref 40–60)
HGB BLD-MCNC: 14.4 G/DL (ref 12–15.9)
LDLC SERPL CALC-MCNC: 122 MG/DL (ref 0–100)
MCH RBC QN AUTO: 27.4 PG (ref 26.6–33)
MCHC RBC AUTO-ENTMCNC: 32.9 G/DL (ref 31.5–35.7)
MCV RBC AUTO: 83.3 FL (ref 79–97)
PLATELET # BLD AUTO: 140 10*3/MM3 (ref 140–450)
POTASSIUM SERPL-SCNC: 4.2 MMOL/L (ref 3.5–5.2)
PROT SERPL-MCNC: 7.9 G/DL (ref 6–8.5)
RBC # BLD AUTO: 5.26 10*6/MM3 (ref 3.77–5.28)
SODIUM SERPL-SCNC: 136 MMOL/L (ref 136–145)
TRIGL SERPL-MCNC: 199 MG/DL (ref 0–150)
VIT B12 SERPL-MCNC: 663 PG/ML (ref 211–946)
VLDLC SERPL CALC-MCNC: 35 MG/DL (ref 5–40)
WBC # BLD AUTO: 5.02 10*3/MM3 (ref 3.4–10.8)

## 2023-10-12 RX ORDER — EZETIMIBE 10 MG/1
10 TABLET ORAL DAILY
Qty: 90 TABLET | Refills: 3 | Status: SHIPPED | OUTPATIENT
Start: 2023-10-12

## 2023-10-12 RX ORDER — BLOOD-GLUCOSE METER
1 EACH MISCELLANEOUS DAILY
Qty: 1 KIT | Refills: 0 | Status: SHIPPED | OUTPATIENT
Start: 2023-10-12

## 2023-10-12 RX ORDER — GLIPIZIDE 5 MG/1
5 TABLET ORAL
Qty: 180 TABLET | Refills: 3 | Status: SHIPPED | OUTPATIENT
Start: 2023-10-12

## 2023-10-12 RX ORDER — LANCETS
EACH MISCELLANEOUS
Qty: 100 EACH | Refills: 12 | Status: SHIPPED | OUTPATIENT
Start: 2023-10-12

## 2023-10-12 RX ORDER — ORAL SEMAGLUTIDE 3 MG/1
3 TABLET ORAL
Qty: 90 TABLET | Refills: 1 | Status: SHIPPED | OUTPATIENT
Start: 2023-10-12 | End: 2024-04-09

## 2023-10-12 RX ORDER — LOSARTAN POTASSIUM 50 MG/1
50 TABLET ORAL DAILY
Qty: 90 TABLET | Refills: 3 | Status: SHIPPED | OUTPATIENT
Start: 2023-10-12

## 2023-10-23 NOTE — TELEPHONE ENCOUNTER
Caller: Cristiana Headley    Relationship: Self    Best call back number: [phone]    Requested Prescriptions:   Requested Prescriptions     Pending Prescriptions Disp Refills    ezetimibe (Zetia) 10 MG tablet 90 tablet 3     Sig: Take 1 tablet by mouth Daily.    metFORMIN (GLUCOPHAGE) 1000 MG tablet 180 tablet 3     Sig: Take 1 tablet by mouth 2 (Two) Times a Day With Meals.    Rybelsus 3 MG tablet 90 tablet 1     Sig: Take 1 tablet by mouth Every Morning Before Breakfast for 180 days.        Pharmacy where request should be sent: [id number] [place], [place] - [address] [address] AT [address] & [address] - [phone] [facility] [phone]      Last office visit with prescribing clinician: 10/11/2023   Last telemedicine visit with prescribing clinician: Visit date not found   Next office visit with prescribing clinician: 11/15/2023     Additional details provided by patient: THESE ARE NEW MEDICATIONS THAT WERE SENT TO THE WRONG PHARMACY. [facility]    Does the patient have less than a 3 day supply:  [x] Yes  [] No      Marley Madden Rep   10/23/23 15:30 EDT

## 2023-10-24 RX ORDER — EZETIMIBE 10 MG/1
10 TABLET ORAL DAILY
Qty: 90 TABLET | Refills: 3 | Status: SHIPPED | OUTPATIENT
Start: 2023-10-24

## 2023-10-24 RX ORDER — ORAL SEMAGLUTIDE 3 MG/1
3 TABLET ORAL
Qty: 90 TABLET | Refills: 1 | Status: SHIPPED | OUTPATIENT
Start: 2023-10-24 | End: 2024-04-21

## 2023-10-25 ENCOUNTER — OFFICE VISIT (OUTPATIENT)
Dept: CARDIOLOGY | Facility: CLINIC | Age: 68
End: 2023-10-25
Payer: MEDICARE

## 2023-10-25 VITALS
WEIGHT: 186.2 LBS | DIASTOLIC BLOOD PRESSURE: 80 MMHG | HEART RATE: 82 BPM | HEIGHT: 63 IN | SYSTOLIC BLOOD PRESSURE: 130 MMHG | BODY MASS INDEX: 32.99 KG/M2

## 2023-10-25 DIAGNOSIS — E78.2 MIXED HYPERLIPIDEMIA: ICD-10-CM

## 2023-10-25 DIAGNOSIS — R07.2 PRECORDIAL PAIN: Primary | ICD-10-CM

## 2023-10-25 DIAGNOSIS — I10 ESSENTIAL HYPERTENSION: ICD-10-CM

## 2023-10-25 PROCEDURE — 99204 OFFICE O/P NEW MOD 45 MIN: CPT | Performed by: INTERNAL MEDICINE

## 2023-10-25 PROCEDURE — 1160F RVW MEDS BY RX/DR IN RCRD: CPT | Performed by: INTERNAL MEDICINE

## 2023-10-25 PROCEDURE — 1159F MED LIST DOCD IN RCRD: CPT | Performed by: INTERNAL MEDICINE

## 2023-10-25 PROCEDURE — 3075F SYST BP GE 130 - 139MM HG: CPT | Performed by: INTERNAL MEDICINE

## 2023-10-25 PROCEDURE — 3079F DIAST BP 80-89 MM HG: CPT | Performed by: INTERNAL MEDICINE

## 2023-10-25 PROCEDURE — 93000 ELECTROCARDIOGRAM COMPLETE: CPT | Performed by: INTERNAL MEDICINE

## 2023-10-25 NOTE — PROGRESS NOTES
"        [facility]      Patient Name: Cristiana Headley  :1955  Age: 68 y.o.  Encounter Provider:  Kaleb Bear Jr, MD      Chief Complaint:   Chief Complaint   Patient presents with    Chest Pain         Chest Pain   Cristiana Headley is a 68 y.o. female past medical history of diabetes, hypertension, dyslipidemia who presents for initial evaluation of chest discomfort.  Patient had 2 episodes of chest discomfort over the past few weeks that woke her from sleep.  She noted associated shortness of air.  She did notice a component of worsening with deep breaths.  She denied any associated nausea, vomiting, diaphoresis.  She has a history of palpitations after her COVID booster but that has been well controlled over the last year.  She did see a cardiologist in [place] at that time.  She just relocated to [place] in the last 4 months.  Blood pressure and heart rate are well controlled today.  She has no family history of coronary artery disease.  She is a lifelong non-smoker denies alcohol or illicit drug use.  She does have a history of total hip replacement which limits her physical activity and has been noticing exertional dyspnea over the last few months.      The following portions of the patient's history were reviewed and updated as appropriate: allergies, current medications, past family history, past medical history, past social history, past surgical history and problem list.      Review of Systems   Cardiovascular:  Positive for chest pain.     OBJECTIVE:   Vital Signs  Vitals:    10/25/23 1029   BP: 130/80   Pulse: 82     Estimated body mass index is 32.98 kg/m² as calculated from the following:    Height as of this encounter: 160 cm (63\").    Weight as of this encounter: 84.5 kg (186 lb 3.2 oz).    Vitals reviewed.   Constitutional:       Appearance: Healthy appearance. Not in distress.   Neck:      Vascular: No JVR. JVD normal.   Pulmonary:      Effort: Pulmonary effort is " normal.      Breath sounds: Normal breath sounds. No wheezing. No rhonchi. No rales.   Chest:      Chest wall: Not tender to palpatation.   Cardiovascular:      PMI at left midclavicular line. Normal rate. Regular rhythm. Normal S1. Normal S2.       Murmurs: There is no murmur.      No gallop.  No click. No rub.   Pulses:     Intact distal pulses.   Edema:     Peripheral edema absent.   Abdominal:      General: Bowel sounds are normal.      Palpations: Abdomen is soft.      Tenderness: There is no abdominal tenderness.   Musculoskeletal: Normal range of motion.         General: No tenderness. Skin:     General: Skin is warm and dry.   Neurological:      General: No focal deficit present.      Mental Status: Alert and oriented to person, place and time.       ECG 12 Lead    Date/Time: 10/25/2023 10:30 AM  Performed by: Kaleb Bear Jr., MD    Authorized by: Kaleb Bear Jr., MD  Comparison: compared with previous ECG from 10/11/2023  Similar to previous ECG  Rhythm: sinus rhythm  Other findings: non-specific ST-T wave changes    Clinical impression: non-specific ECG      Lipid Panel          10/11/2023    12:04   Lipid Panel   Total Cholesterol 220    Triglycerides 199    HDL Cholesterol 63    VLDL Cholesterol 35    LDL Cholesterol  122         BUN   Date Value Ref Range Status   10/11/2023 10 8 - 23 mg/dL Final   04/22/2022 11 8 - 23 mg/dL Final     Creatinine   Date Value Ref Range Status   10/11/2023 0.65 0.57 - 1.00 mg/dL Final   04/22/2022 0.63 0.57 - 1.00 mg/dL Final     Potassium   Date Value Ref Range Status   10/11/2023 4.2 3.5 - 5.2 mmol/L Final   04/22/2022 4.2 3.5 - 5.2 mmol/L Final     ALT (SGPT)   Date Value Ref Range Status   10/11/2023 70 (H) 1 - 33 U/L Final     AST (SGOT)   Date Value Ref Range Status   10/11/2023 80 (H) 1 - 32 U/L Final           ASSESSMENT:     68-year-old female past medical history of diabetes, hypertension, dyslipidemia presents for evaluation of precordial pain      PLAN  OF CARE:     Precordial pain -high risk clinical factors in patient who cannot perform at least 5 METS.  Plan for pharmacological nuclear stress study.  Essential hypertension -well-controlled today in clinic.  Continue twice daily blood pressure log and sodium restricted diet.  Mixed hyperlipidemia  Diabetes    Return to clinic 12 months    We were ultimately able to get records from a cardiologist in [place] from 2021.  Appears that in August 2020 she had a nuclear stress study showing normal EF with no significant ischemia or infarction.  We will move forward with testing as planned.         Discharge Medications            Accurate as of October 25, 2023 10:29 AM. If you have any questions, ask your nurse or doctor.                Continue These Medications        Instructions Start Date   Accu-Chek Chrissy Plus test strip  Generic drug: glucose blood   Use to check fasting blood sugar daily      ezetimibe 10 MG tablet  Commonly known as: Zetia   10 mg, Oral, Daily      glipizide 5 MG tablet  Commonly known as: GLUCOTROL   5 mg, Oral, 2 Times Daily Before Meals      ibuprofen 400 MG tablet  Commonly known as: ADVIL,MOTRIN   400 mg, Oral, Every 6 Hours PRN, LD 4/18.. TO HOLD FOR SX      losartan 50 MG tablet  Commonly known as: COZAAR   50 mg, Oral, Daily      metFORMIN 1000 MG tablet  Commonly known as: GLUCOPHAGE   1,000 mg, Oral, 2 Times Daily With Meals      onetouch ultrasoft lancets   Use to check fasting blood sugar once daily      OneTouch Verio w/Device kit   1 each, Does not apply, Daily      Rybelsus 3 MG tablet  Generic drug: Semaglutide   3 mg, Oral, Every Morning Before Breakfast               Thank you for allowing me to participate in the care of your patient,      Sincerely,   Kaleb Bear MD  [facility]  10/25/23  10:29 EDT

## 2023-11-06 NOTE — PROGRESS NOTES
"Chief Complaint  Diabetes (Follow up )    Subjective        HPI   michael presents to [facility] for a follow up visit.     Mom not doing well to due recent surgery, so that is a stressor.    Since I last saw her, she met with Dr. Bear who recommended a stress test. That is scheduled for this week.    I still do not have records from her old PCP and GI.    Her A1c was poor at 9.2. Rybelsus was added to Glipizide and Metformin. Rybelsus too expensive. Diabetic eye exam pending. Interested in seeing a dietician. Loves to cook. Probably does not cook the healthiest foods, but the meals are tasty!    Chronic cough: Has had for 2-3 years, off and on. Got really bad when she moved back here. Never smoked. She tells me her oxygen level drops with surgeries. Worked in orthotics industry, used a lot of glues, contact cement, fiberglass, didn't wear masks.      Needs shingles vax    COVID 2021, booster         Objective   Vital Signs:   Vitals:    11/15/23 0756   BP: 114/62   BP Location: Right arm   Patient Position: Sitting   Cuff Size: Adult   Pulse: 78   SpO2: 96%   Weight: 84.1 kg (185 lb 8 oz)   Height: 160 cm (63\")           Physical Exam  Constitutional:       General: She is not in acute distress.     Appearance: Normal appearance.   HENT:      Head: Normocephalic and atraumatic.   Eyes:      Conjunctiva/sclera: Conjunctivae normal.   Cardiovascular:      Rate and Rhythm: Normal rate.   Pulmonary:      Effort: Pulmonary effort is normal.   Musculoskeletal:         General: No swelling or deformity.   Skin:     Coloration: Skin is not jaundiced.      Findings: No rash.   Neurological:      General: No focal deficit present.      Mental Status: She is alert and oriented to person, place, and time.   Psychiatric:         Mood and Affect: Mood normal.         Behavior: Behavior normal.         Judgment: Judgment normal.          Result Review :     The following data was reviewed by: " Sabra Guadarrama MD on 11/15/2023:  Office Visit with Kaleb Bear Jr., MD (10/25/2023)          Assessment and Plan    Diagnoses and all orders for this visit:    1. Type 2 diabetes mellitus with hyperglycemia, without long-term current use of insulin (Primary)  Assessment & Plan:  A1c: Too high, 9.2  Dilated Eye Exam: Due, referred to ophtho last visit  Urine microalbumin: today  On ACE/ARB? Yes  Statin: no, LFTs high (still need records), started on Zetia  Pneumococcal vaccination: UTD    Was chronically already on Metformin and Glipizide. Added Rybelsus but was WAY too expensive. Wonder if needs PA. Mounjaro would also be a great option. Will sent to pharmacy and see if that is covered.    Referred to dietician.           Orders:  -     Microalbumin / Creatinine Urine Ratio - Urine, Clean Catch  -     Tirzepatide (Mounjaro) 2.5 MG/0.5ML solution pen-injector; Inject 0.5 mL under the skin into the appropriate area as directed 1 (One) Time Per Week.  Dispense: 2 mL; Refill: 1  -     Ambulatory Referral to Nutrition Services    2. Chronic cough  Assessment & Plan:  Could be AR, less likely due to ARB, but also with hx of occupational exposures and reports of desats with surgeries. Start with PFTs and go from there.     Orders:  -     Complete PFT - Pre & Post Bronchodilator; Future  -     Hemoglobin; Future    3. Screening for osteoporosis  -     DEXA Bone Density Axial; Future    4. Unspecified menopausal and perimenopausal disorder  -     DEXA Bone Density Axial; Future    5. Primary hypertension  Assessment & Plan:  Controlled on Losartan, continue    Orders:  -     Ambulatory Referral to Nutrition Services    6. Hyperlipidemia, unspecified hyperlipidemia type  -     Ambulatory Referral to Nutrition Services    7. Obesity (BMI 30-39.9)  Assessment & Plan:  Patient's (Body mass index is 32.86 kg/m².) indicates that they are obese (BMI >30) with health conditions that include hypertension, diabetes mellitus, and  dyslipidemias . Weight is unchanged. BMI  is above average; BMI management plan is completed. We discussed  referring to dietician to discuss healthier diet options .     Orders:  -     Ambulatory Referral to Nutrition Services    8. Chest pain, unspecified type  Assessment & Plan:  Stress test scheduled per Dr. Bear      9. Liver mass  Assessment & Plan:  Awaiting records to determine etiology      10. Polyp of colon, unspecified part of colon, unspecified type  Assessment & Plan:  Has been referred to GI      11. Hepatic cirrhosis, unspecified hepatic cirrhosis type, unspecified whether ascites present  Assessment & Plan:  Still working to obtain records (have requested several times). Pt going to call prior GI office. Referred to GI, has appt.      12. Routine health maintenance  Assessment & Plan:  Has been referred for C-scope  Still needing vaccine records from prior PCP  [id number] UTD  DEXA ordered today   Mammogram normal  Mammo Screening Digital Tomosynthesis Bilateral With CAD (11/08/2023 14:23)   AWV next visit          Follow Up   Return in about 2 months (around 1/15/2024) for Medicare Wellness and regular appt-30 minutes.  Patient was given instructions and counseling regarding her condition or for health maintenance advice. Please see specific information pulled into the AVS if appropriate.

## 2023-11-08 ENCOUNTER — HOSPITAL ENCOUNTER (OUTPATIENT)
Dept: MAMMOGRAPHY | Facility: HOSPITAL | Age: 68
Discharge: HOME | End: 2023-11-08
Admitting: INTERNAL MEDICINE
Payer: MEDICARE

## 2023-11-08 DIAGNOSIS — Z12.31 ENCOUNTER FOR SCREENING MAMMOGRAM FOR MALIGNANT NEOPLASM OF BREAST: ICD-10-CM

## 2023-11-08 PROCEDURE — 77067 SCR MAMMO BI INCL CAD: CPT

## 2023-11-08 PROCEDURE — 77063 BREAST TOMOSYNTHESIS BI: CPT

## 2023-11-15 ENCOUNTER — OFFICE VISIT (OUTPATIENT)
Dept: FAMILY MEDICINE CLINIC | Facility: CLINIC | Age: 68
End: 2023-11-15
Payer: MEDICARE

## 2023-11-15 VITALS
HEART RATE: 78 BPM | OXYGEN SATURATION: 96 % | HEIGHT: 63 IN | DIASTOLIC BLOOD PRESSURE: 62 MMHG | SYSTOLIC BLOOD PRESSURE: 114 MMHG | WEIGHT: 185.5 LBS | BODY MASS INDEX: 32.87 KG/M2

## 2023-11-15 DIAGNOSIS — E78.5 HYPERLIPIDEMIA, UNSPECIFIED HYPERLIPIDEMIA TYPE: ICD-10-CM

## 2023-11-15 DIAGNOSIS — E66.9 OBESITY (BMI 30-39.9): ICD-10-CM

## 2023-11-15 DIAGNOSIS — N95.9 UNSPECIFIED MENOPAUSAL AND PERIMENOPAUSAL DISORDER: ICD-10-CM

## 2023-11-15 DIAGNOSIS — K74.60 HEPATIC CIRRHOSIS, UNSPECIFIED HEPATIC CIRRHOSIS TYPE, UNSPECIFIED WHETHER ASCITES PRESENT: ICD-10-CM

## 2023-11-15 DIAGNOSIS — Z00.00 ROUTINE HEALTH MAINTENANCE: ICD-10-CM

## 2023-11-15 DIAGNOSIS — E11.65 TYPE 2 DIABETES MELLITUS WITH HYPERGLYCEMIA, WITHOUT LONG-TERM CURRENT USE OF INSULIN: Primary | ICD-10-CM

## 2023-11-15 DIAGNOSIS — R16.0 LIVER MASS: ICD-10-CM

## 2023-11-15 DIAGNOSIS — R07.9 CHEST PAIN, UNSPECIFIED TYPE: ICD-10-CM

## 2023-11-15 DIAGNOSIS — K63.5 POLYP OF COLON, UNSPECIFIED PART OF COLON, UNSPECIFIED TYPE: ICD-10-CM

## 2023-11-15 DIAGNOSIS — Z13.820 SCREENING FOR OSTEOPOROSIS: ICD-10-CM

## 2023-11-15 DIAGNOSIS — I10 PRIMARY HYPERTENSION: ICD-10-CM

## 2023-11-15 DIAGNOSIS — R05.3 CHRONIC COUGH: ICD-10-CM

## 2023-11-15 PROBLEM — S42.253A CLOSED FRACTURE OF GREATER TUBEROSITY OF HUMERUS: Status: RESOLVED | Noted: 2019-08-16 | Resolved: 2023-11-15

## 2023-11-15 RX ORDER — TIRZEPATIDE 2.5 MG/.5ML
2.5 INJECTION, SOLUTION SUBCUTANEOUS WEEKLY
Qty: 2 ML | Refills: 1 | Status: SHIPPED | OUTPATIENT
Start: 2023-11-15

## 2023-11-15 NOTE — ASSESSMENT & PLAN NOTE
Still working to obtain records (have requested several times). Pt going to call prior GI office. Referred to GI, has appt.

## 2023-11-15 NOTE — ASSESSMENT & PLAN NOTE
Patient's (Body mass index is 32.86 kg/m².) indicates that they are obese (BMI >30) with health conditions that include hypertension, diabetes mellitus, and dyslipidemias . Weight is unchanged. BMI  is above average; BMI management plan is completed. We discussed  referring to dietician to discuss healthier diet options .

## 2023-11-15 NOTE — ASSESSMENT & PLAN NOTE
Has been referred for C-scope  Still needing vaccine records from prior PCP  [id number] UTD  DEXA ordered today   Mammogram normal  Mammo Screening Digital Tomosynthesis Bilateral With CAD (11/08/2023 14:23)   AWV next visit

## 2023-11-15 NOTE — ASSESSMENT & PLAN NOTE
Could be AR, less likely due to ARB, but also with hx of occupational exposures and reports of desats with surgeries. Start with PFTs and go from there.

## 2023-11-15 NOTE — ASSESSMENT & PLAN NOTE
A1c: Too high, 9.2  Dilated Eye Exam: Due, referred to ophtho last visit  Urine microalbumin: today  On ACE/ARB? Yes  Statin: no, LFTs high (still need records), started on Zetia  Pneumococcal vaccination: UTD    Was chronically already on Metformin and Glipizide. Added Rybelsus but was WAY too expensive. Wonder if needs PA. Mounjaro would also be a great option. Will sent to pharmacy and see if that is covered.    Referred to dietician.

## 2023-11-16 LAB
ALBUMIN/CREAT UR: 5 MG/G CREAT (ref 0–29)
CREAT UR-MCNC: 135.4 MG/DL
MICROALBUMIN UR-MCNC: 7.1 UG/ML

## 2023-11-22 ENCOUNTER — HOSPITAL ENCOUNTER (OUTPATIENT)
Dept: CARDIOLOGY | Facility: HOSPITAL | Age: 68
Discharge: HOME | End: 2023-11-22
Admitting: INTERNAL MEDICINE
Payer: MEDICARE

## 2023-11-22 VITALS — HEIGHT: 63 IN | WEIGHT: 187.39 LBS | BODY MASS INDEX: 33.2 KG/M2

## 2023-11-22 DIAGNOSIS — R07.2 PRECORDIAL PAIN: ICD-10-CM

## 2023-11-22 LAB
BH CV NUCLEAR PRIOR STUDY: 2
BH CV REST NUCLEAR ISOTOPE DOSE: 10.5 MCI
BH CV STRESS BP STAGE 1: NORMAL
BH CV STRESS COMMENTS STAGE 1: NORMAL
BH CV STRESS DOSE REGADENOSON STAGE 1: 0.4
BH CV STRESS DURATION MIN STAGE 1: 0
BH CV STRESS DURATION SEC STAGE 1: 10
BH CV STRESS HR STAGE 1: 114
BH CV STRESS NUCLEAR ISOTOPE DOSE: 35.9 MCI
BH CV STRESS PROTOCOL 1: NORMAL
BH CV STRESS RECOVERY BP: NORMAL MMHG
BH CV STRESS RECOVERY HR: 95 BPM
BH CV STRESS STAGE 1: 1
LV EF NUC BP: 80 %
MAXIMAL PREDICTED HEART RATE: 151 BPM
PERCENT MAX PREDICTED HR: 75.5 %
STRESS BASELINE BP: NORMAL MMHG
STRESS BASELINE HR: 80 BPM
STRESS PERCENT HR: 89 %
STRESS POST EXERCISE DUR SEC: 10 SEC
STRESS POST PEAK BP: NORMAL MMHG
STRESS POST PEAK HR: 114 BPM
STRESS TARGET HR: 128 BPM

## 2023-11-22 PROCEDURE — A9502 TC99M TETROFOSMIN: HCPCS | Performed by: INTERNAL MEDICINE

## 2023-11-22 PROCEDURE — 78452 HT MUSCLE IMAGE SPECT MULT: CPT

## 2023-11-22 PROCEDURE — 25010000002 REGADENOSON 0.4 MG/5ML SOLUTION: Performed by: INTERNAL MEDICINE

## 2023-11-22 PROCEDURE — 0 TECHNETIUM TETROFOSMIN KIT: Performed by: INTERNAL MEDICINE

## 2023-11-22 PROCEDURE — 93017 CV STRESS TEST TRACING ONLY: CPT

## 2023-11-22 RX ORDER — REGADENOSON 0.08 MG/ML
0.4 INJECTION, SOLUTION INTRAVENOUS
Status: COMPLETED | OUTPATIENT
Start: 2023-11-22 | End: 2023-11-22

## 2023-11-22 RX ADMIN — TETROFOSMIN 1 DOSE: 1.38 INJECTION, POWDER, LYOPHILIZED, FOR SOLUTION INTRAVENOUS at 08:40

## 2023-11-22 RX ADMIN — TETROFOSMIN 1 DOSE: 1.38 INJECTION, POWDER, LYOPHILIZED, FOR SOLUTION INTRAVENOUS at 07:57

## 2023-11-22 RX ADMIN — REGADENOSON 0.4 MG: 0.08 INJECTION, SOLUTION INTRAVENOUS at 08:41

## 2023-11-27 ENCOUNTER — TELEPHONE (OUTPATIENT)
Dept: FAMILY MEDICINE CLINIC | Facility: CLINIC | Age: 68
End: 2023-11-27
Payer: MEDICARE

## 2023-11-27 ENCOUNTER — TELEPHONE (OUTPATIENT)
Dept: CARDIOLOGY | Facility: CLINIC | Age: 68
End: 2023-11-27
Payer: MEDICARE

## 2023-11-27 NOTE — TELEPHONE ENCOUNTER
I feel that it is okay for her to have another COVID booster however she may have palpitations again.  If she decides to not have the COVID booster due to palpitations then that is also reasonable.  The final decision is up to her

## 2023-11-27 NOTE — TELEPHONE ENCOUNTER
I called and spoke with patient and she verbalizes understanding.    Jocelyne Lang RN  [facility] Triage  11/27/23 14:55 EST

## 2023-11-27 NOTE — TELEPHONE ENCOUNTER
Called patient and I gave her our correct fax number. She is sending it over one more time. I told her to print everything and bring them to her next visit.

## 2023-11-27 NOTE — TELEPHONE ENCOUNTER
Please inform patient perfusion stress test shows no evidence of tightly blocked coronary artery, heart remains strong and the study is low risk.

## 2023-11-27 NOTE — TELEPHONE ENCOUNTER
Notified patient of results, patient verbalizes understanding.    Patient has one question for us.  Her PCP states she is a candidate for another COVID booster.  Her PCP wanted her to ask us if it is ok.  Patient had some palpitations/irregular heart beat for a few weeks following her last booster.  She saw a cardiologist in [place].  She reports nothing had to be done about the palpitations.  I am unable to find records elaborating about this.        Do you feel she is ok for another booster from cardiology standpoint?    Jocelyne Lang RN  [facility] Triage  11/27/23 11:12 EST

## 2023-12-01 ENCOUNTER — HOSPITAL ENCOUNTER (OUTPATIENT)
Dept: BONE DENSITY | Facility: HOSPITAL | Age: 68
Discharge: HOME | End: 2023-12-01
Admitting: INTERNAL MEDICINE
Payer: MEDICARE

## 2023-12-01 DIAGNOSIS — N95.9 UNSPECIFIED MENOPAUSAL AND PERIMENOPAUSAL DISORDER: ICD-10-CM

## 2023-12-01 DIAGNOSIS — Z13.820 SCREENING FOR OSTEOPOROSIS: ICD-10-CM

## 2023-12-01 PROCEDURE — 77080 DXA BONE DENSITY AXIAL: CPT

## 2023-12-04 ENCOUNTER — HOSPITAL ENCOUNTER (OUTPATIENT)
Dept: RESPIRATORY THERAPY | Facility: HOSPITAL | Age: 68
Discharge: HOME | End: 2023-12-04
Admitting: INTERNAL MEDICINE
Payer: MEDICARE

## 2023-12-04 DIAGNOSIS — R05.3 CHRONIC COUGH: ICD-10-CM

## 2023-12-04 LAB
BDY SITE: NORMAL
CALCULATED HEMOGLOBIN, EPOC: NORMAL
HGB BLDA-MCNC: 13 G/DL (ref 12–18)
Lab: NORMAL

## 2023-12-04 PROCEDURE — 82820 HEMOGLOBIN-OXYGEN AFFINITY: CPT | Performed by: INTERNAL MEDICINE

## 2023-12-04 PROCEDURE — 94640 AIRWAY INHALATION TREATMENT: CPT

## 2023-12-04 PROCEDURE — 94729 DIFFUSING CAPACITY: CPT

## 2023-12-04 PROCEDURE — 94060 EVALUATION OF WHEEZING: CPT

## 2023-12-04 PROCEDURE — 94726 PLETHYSMOGRAPHY LUNG VOLUMES: CPT

## 2023-12-04 RX ORDER — ALBUTEROL SULFATE 2.5 MG/3ML
2.5 SOLUTION RESPIRATORY (INHALATION) ONCE
Status: COMPLETED | OUTPATIENT
Start: 2023-12-04 | End: 2023-12-04

## 2023-12-04 RX ADMIN — ALBUTEROL SULFATE 2.5 MG: 2.5 SOLUTION RESPIRATORY (INHALATION) at 14:18

## 2023-12-12 ENCOUNTER — PREP FOR SURGERY (OUTPATIENT)
Dept: SURGERY | Facility: SURGERY CENTER | Age: 68
End: 2023-12-12
Payer: MEDICARE

## 2023-12-12 ENCOUNTER — TELEPHONE (OUTPATIENT)
Dept: GASTROENTEROLOGY | Facility: CLINIC | Age: 68
End: 2023-12-12

## 2023-12-12 ENCOUNTER — OFFICE VISIT (OUTPATIENT)
Dept: GASTROENTEROLOGY | Facility: CLINIC | Age: 68
End: 2023-12-12
Payer: MEDICARE

## 2023-12-12 VITALS
HEART RATE: 90 BPM | DIASTOLIC BLOOD PRESSURE: 78 MMHG | TEMPERATURE: 96.8 F | SYSTOLIC BLOOD PRESSURE: 130 MMHG | WEIGHT: 186.5 LBS | OXYGEN SATURATION: 96 % | HEIGHT: 62 IN | BODY MASS INDEX: 34.32 KG/M2

## 2023-12-12 DIAGNOSIS — K74.60 HEPATIC CIRRHOSIS, UNSPECIFIED HEPATIC CIRRHOSIS TYPE, UNSPECIFIED WHETHER ASCITES PRESENT: Primary | ICD-10-CM

## 2023-12-12 DIAGNOSIS — R74.8 ELEVATED LIVER ENZYMES: ICD-10-CM

## 2023-12-12 DIAGNOSIS — Z12.11 ENCOUNTER FOR SCREENING FOR MALIGNANT NEOPLASM OF COLON: ICD-10-CM

## 2023-12-12 DIAGNOSIS — K63.5 POLYP OF COLON, UNSPECIFIED PART OF COLON, UNSPECIFIED TYPE: ICD-10-CM

## 2023-12-12 DIAGNOSIS — R16.0 LIVER MASS: ICD-10-CM

## 2023-12-12 PROCEDURE — 3078F DIAST BP <80 MM HG: CPT | Performed by: INTERNAL MEDICINE

## 2023-12-12 PROCEDURE — 1160F RVW MEDS BY RX/DR IN RCRD: CPT | Performed by: INTERNAL MEDICINE

## 2023-12-12 PROCEDURE — 3075F SYST BP GE 130 - 139MM HG: CPT | Performed by: INTERNAL MEDICINE

## 2023-12-12 PROCEDURE — 99204 OFFICE O/P NEW MOD 45 MIN: CPT | Performed by: INTERNAL MEDICINE

## 2023-12-12 PROCEDURE — 1159F MED LIST DOCD IN RCRD: CPT | Performed by: INTERNAL MEDICINE

## 2023-12-12 RX ORDER — SODIUM CHLORIDE 0.9 % (FLUSH) 0.9 %
3 SYRINGE (ML) INJECTION EVERY 12 HOURS SCHEDULED
OUTPATIENT
Start: 2023-12-12

## 2023-12-12 RX ORDER — SODIUM CHLORIDE, SODIUM LACTATE, POTASSIUM CHLORIDE, CALCIUM CHLORIDE 600; 310; 30; 20 MG/100ML; MG/100ML; MG/100ML; MG/100ML
30 INJECTION, SOLUTION INTRAVENOUS CONTINUOUS PRN
OUTPATIENT
Start: 2023-12-12

## 2023-12-12 RX ORDER — ALBUTEROL SULFATE 90 UG/1
2 AEROSOL, METERED RESPIRATORY (INHALATION) EVERY 6 HOURS PRN
COMMUNITY
End: 2023-12-18 | Stop reason: SDUPTHER

## 2023-12-12 RX ORDER — SODIUM CHLORIDE 0.9 % (FLUSH) 0.9 %
10 SYRINGE (ML) INJECTION AS NEEDED
OUTPATIENT
Start: 2023-12-12

## 2023-12-12 RX ORDER — ASPIRIN 81 MG/1
TABLET ORAL
COMMUNITY

## 2023-12-12 NOTE — TELEPHONE ENCOUNTER
Called and spoke with patient. She, again, stated that she has not has an MRI in years--definitely not in 2022. She said she had seen a GI provider in [place], Dr. Sandra Mendoza, who had ordered an abdominal CT with contrast. According to the patient, that was where the liver abnormality was first noted. I called Dr. Mendoza's office and requested the CT to be faxed to our office; they stated the CT is being faxed now. Dr. Jacome notified of update. lb

## 2023-12-12 NOTE — PATIENT INSTRUCTIONS
Check lab work for monitoring of cirrhosis.    Schedule liver ultrasound for surveillance of liver cyst and monitoring of cirrhosis.    Schedule colonoscopy for surveillance of colon polyps.    Schedule EGD to evaluate for esophageal varices.

## 2023-12-12 NOTE — PROGRESS NOTES
"Chief Complaint   Patient presents with    GI Problem           History of Present Illness  Patient here today for consultation regarding liver lesion as well as cirrhosis and history of colon polyps.  Most recent liver enzymes showed AST of 80 and ALT of 70.  Total bilirubin 0.6.     Per the primary care notes: States she has a \"huge\" liver mass, has had since in her 30s, has been growing, occ gets RUQ pains. States she almost had a liver surgery and was under evaluation for liver transplant but that no one wanted to operate on her. She also reports she was told she had cirrhosis. Avoids APAP, takes ibuprofen for pain. Doesn't drink ETOH.  Got care at [facility], Dr. Flores ([phone]) for this.     Her CT from 2019 was reviewed showing evidence of cirrhosis and splenomegaly as well as an 11.9 cm cystic lesion.    She reports she saw a transplant surgeon about the liver cyst in her 30s while living in [place]. She reports they ultimately decided not to operate. She reports the cyst has grown over the years and at times causes pain to her RUQ.    She reports at times she has fecal urgency and incontinence. She denies any blood in her stool. She reports she often has urgency after eating.    Result Review :       Progress Notes by Sabra Guadarrama MD (11/15/2023 08:00)   Comprehensive Metabolic Panel (10/11/2023 12:04)   CBC (No Diff) (10/11/2023 12:04)     Vital Signs:   /78   Pulse 90   Temp 96.8 °F (36 °C)   Ht 157.5 cm (62\")   Wt 84.6 kg (186 lb 8 oz)   SpO2 96%   BMI 34.11 kg/m²     Body mass index is 34.11 kg/m².     Physical Exam  Constitutional:       Appearance: Normal appearance.   Abdominal:      Palpations: Abdomen is soft.   Psychiatric:         Behavior: Behavior normal.           Assessment and Plan    Diagnoses and all orders for this visit:    1. Hepatic cirrhosis, unspecified hepatic cirrhosis type, unspecified whether ascites present (Primary)  -     CBC & Differential  -    "  Comprehensive Metabolic Panel  -     AFP Tumor Marker  -     Protime-INR  -     Ammonia  -     US Liver; Future    2. Polyp of colon, unspecified part of colon, unspecified type    3. Liver mass  -     US Liver; Future    4. Elevated liver enzymes  -     CBC & Differential  -     Comprehensive Metabolic Panel  -     AFP Tumor Marker  -     Protime-INR  -     Ammonia  -     US Liver; Future         BRIEF SUMMARY  Patient today for consultation regarding her cirrhosis and history of colon polyps and hepatic cyst.  We will plan for an EGD to assess for varices and will check alpha-fetoprotein and ultrasound her liver to follow-up on the cyst and to rule out hepatoma.  Also check a CBC, CMP and PT/INR and will calculate her MELD score.  Also schedule colonoscopy to follow-up on history of colon polyps.    I have reviewed and confirmed the accuracy of the HPI and Assessment and Plan as documented by the APRN PEDRO Muñiz        Follow Up   Return for Follow up to review results after testing complete.    Patient Instructions   Check lab work for monitoring of cirrhosis.    Schedule liver ultrasound for surveillance of liver cyst and monitoring of cirrhosis.    Schedule colonoscopy for surveillance of colon polyps.    Schedule EGD to evaluate for esophageal varices.          Documentation by Laurie VASQUEZ acting as a scribe in the following sections on behalf of the billable provider: HPI, ROS, assessment, & plan.

## 2023-12-12 NOTE — TELEPHONE ENCOUNTER
Pt called back reporting that she missed a call+VM from [facility].     We received MRI images without a report, date of study 2022. RN called with the intention of clarifying where it was done, so we can call and claim the radiology report/interpretation.     Pt states that she has not had an MRI done recently and that those images do not belong to her. Images have her name and . Apparently this is a mismatch.    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

## 2023-12-13 LAB
AFP-TM SERPL-MCNC: 3.2 NG/ML (ref 0–9.2)
ALBUMIN SERPL-MCNC: 4.5 G/DL (ref 3.9–4.9)
ALBUMIN/GLOB SERPL: 1.4 {RATIO} (ref 1.2–2.2)
ALP SERPL-CCNC: 219 IU/L (ref 44–121)
ALT SERPL-CCNC: 58 IU/L (ref 0–32)
AMMONIA PLAS-MCNC: 64 UG/DL (ref 34–178)
AST SERPL-CCNC: 92 IU/L (ref 0–40)
BASOPHILS # BLD AUTO: 0 X10E3/UL (ref 0–0.2)
BASOPHILS NFR BLD AUTO: 1 %
BILIRUB SERPL-MCNC: 0.6 MG/DL (ref 0–1.2)
BUN SERPL-MCNC: 14 MG/DL (ref 8–27)
BUN/CREAT SERPL: 19 (ref 12–28)
CALCIUM SERPL-MCNC: 9.6 MG/DL (ref 8.7–10.3)
CHLORIDE SERPL-SCNC: 104 MMOL/L (ref 96–106)
CO2 SERPL-SCNC: 24 MMOL/L (ref 20–29)
CREAT SERPL-MCNC: 0.73 MG/DL (ref 0.57–1)
EGFRCR SERPLBLD CKD-EPI 2021: 89 ML/MIN/1.73
EOSINOPHIL # BLD AUTO: 0.3 X10E3/UL (ref 0–0.4)
EOSINOPHIL NFR BLD AUTO: 4 %
ERYTHROCYTE [DISTWIDTH] IN BLOOD BY AUTOMATED COUNT: 13.3 % (ref 11.7–15.4)
GLOBULIN SER CALC-MCNC: 3.3 G/DL (ref 1.5–4.5)
GLUCOSE SERPL-MCNC: 131 MG/DL (ref 70–99)
HCT VFR BLD AUTO: 42.4 % (ref 34–46.6)
HGB BLD-MCNC: 13.5 G/DL (ref 11.1–15.9)
IMM GRANULOCYTES # BLD AUTO: 0 X10E3/UL (ref 0–0.1)
IMM GRANULOCYTES NFR BLD AUTO: 1 %
INR PPP: NORMAL
LYMPHOCYTES # BLD AUTO: 1.1 X10E3/UL (ref 0.7–3.1)
LYMPHOCYTES NFR BLD AUTO: 15 %
MCH RBC QN AUTO: 26.6 PG (ref 26.6–33)
MCHC RBC AUTO-ENTMCNC: 31.8 G/DL (ref 31.5–35.7)
MCV RBC AUTO: 84 FL (ref 79–97)
MONOCYTES # BLD AUTO: 0.4 X10E3/UL (ref 0.1–0.9)
MONOCYTES NFR BLD AUTO: 6 %
NEUTROPHILS # BLD AUTO: 5.4 X10E3/UL (ref 1.4–7)
NEUTROPHILS NFR BLD AUTO: 73 %
PLATELET # BLD AUTO: 196 X10E3/UL (ref 150–450)
POTASSIUM SERPL-SCNC: 4.8 MMOL/L (ref 3.5–5.2)
PROT SERPL-MCNC: 7.8 G/DL (ref 6–8.5)
PROTHROMBIN TIME: NORMAL S
RBC # BLD AUTO: 5.08 X10E6/UL (ref 3.77–5.28)
SODIUM SERPL-SCNC: 144 MMOL/L (ref 134–144)
SPECIMEN STATUS: NORMAL
WBC # BLD AUTO: 7.3 X10E3/UL (ref 3.4–10.8)

## 2023-12-18 DIAGNOSIS — E11.9 TYPE 2 DIABETES MELLITUS WITHOUT COMPLICATION, WITHOUT LONG-TERM CURRENT USE OF INSULIN: Primary | ICD-10-CM

## 2023-12-18 RX ORDER — ALBUTEROL SULFATE 90 UG/1
2 AEROSOL, METERED RESPIRATORY (INHALATION) EVERY 6 HOURS PRN
Status: CANCELLED | OUTPATIENT
Start: 2023-12-18

## 2023-12-18 RX ORDER — ALBUTEROL SULFATE 90 UG/1
2 AEROSOL, METERED RESPIRATORY (INHALATION) EVERY 4 HOURS PRN
Qty: 6.7 G | Refills: 5 | Status: SHIPPED | OUTPATIENT
Start: 2023-12-18

## 2023-12-18 RX ORDER — LANOLIN ALCOHOL/MO/W.PET/CERES
1000 CREAM (GRAM) TOPICAL DAILY
Qty: 90 TABLET | Refills: 3 | Status: SHIPPED | OUTPATIENT
Start: 2023-12-18

## 2023-12-19 RX ORDER — GLIPIZIDE 5 MG/1
5 TABLET, FILM COATED, EXTENDED RELEASE ORAL 2 TIMES DAILY
Qty: 180 TABLET | Refills: 2 | Status: SHIPPED | OUTPATIENT
Start: 2023-12-19

## 2023-12-22 ENCOUNTER — HOSPITAL ENCOUNTER (OUTPATIENT)
Dept: ULTRASOUND IMAGING | Facility: HOSPITAL | Age: 68
Discharge: HOME | End: 2023-12-22
Admitting: INTERNAL MEDICINE
Payer: MEDICARE

## 2023-12-22 DIAGNOSIS — R74.8 ELEVATED LIVER ENZYMES: ICD-10-CM

## 2023-12-22 DIAGNOSIS — K74.60 HEPATIC CIRRHOSIS, UNSPECIFIED HEPATIC CIRRHOSIS TYPE, UNSPECIFIED WHETHER ASCITES PRESENT: ICD-10-CM

## 2023-12-22 DIAGNOSIS — R16.0 LIVER MASS: ICD-10-CM

## 2023-12-22 PROCEDURE — 76705 ECHO EXAM OF ABDOMEN: CPT

## 2023-12-28 DIAGNOSIS — R16.0 LIVER MASS: Primary | ICD-10-CM

## 2024-01-05 DIAGNOSIS — E11.65 TYPE 2 DIABETES MELLITUS WITH HYPERGLYCEMIA, WITHOUT LONG-TERM CURRENT USE OF INSULIN: ICD-10-CM

## 2024-01-05 NOTE — TELEPHONE ENCOUNTER
Caller: Cristiana Headley    Relationship: Self    Best call back number: [phone]     Requested Prescriptions:   Requested Prescriptions     Pending Prescriptions Disp Refills    losartan (COZAAR) 50 MG tablet 90 tablet 3     Sig: Take 1 tablet by mouth Daily.    Tirzepatide (Mounjaro) 2.5 MG/0.5ML solution pen-injector pen 2 mL 1     Sig: Inject 0.5 mL under the skin into the appropriate area as directed 1 (One) Time Per Week.    Blood Glucose Monitoring Suppl (OneTouch Verio) w/Device kit 1 kit 0     Sig: Use 1 each Daily.    Lancets (onetouch ultrasoft) lancets 100 each 12     Sig: Use to check fasting blood sugar once daily    glucose blood test strip 100 each 12     Sig: Use to check fasting blood sugar daily        Pharmacy where request should be sent: [address] [phone] [place] [phone]      Last office visit with prescribing clinician: 11/15/2023   Last telemedicine visit with prescribing clinician: Visit date not found   Next office visit with prescribing clinician: 1/24/2024     Additional details provided by patient:     THIS IS THE FIRST TIME PATIENT IS USING THIS PHARMACY AND THESE NEED TO BE FAXED TO THEM AS A NEW SCRIPT      Does the patient have less than a 3 day supply:  [x] Yes  [] No    Would you like a call back once the refill request has been completed: [x] Yes [] No    If the office needs to give you a call back, can they leave a voicemail: [] Yes [] No    Marley Sawyer Rep   01/05/24 11:42 EST

## 2024-01-05 NOTE — TELEPHONE ENCOUNTER
Rx Refill Note  Requested Prescriptions     Pending Prescriptions Disp Refills    losartan (COZAAR) 50 MG tablet 90 tablet 3     Sig: Take 1 tablet by mouth Daily.    Tirzepatide (Mounjaro) 2.5 MG/0.5ML solution pen-injector pen 2 mL 1     Sig: Inject 0.5 mL under the skin into the appropriate area as directed 1 (One) Time Per Week.    Blood Glucose Monitoring Suppl (OneTouch Verio) w/Device kit 1 kit 0     Sig: Use 1 each Daily.    Lancets (onetouch ultrasoft) lancets 100 each 12     Sig: Use to check fasting blood sugar once daily    glucose blood test strip 100 each 12     Sig: Use to check fasting blood sugar daily      Last office visit with prescribing clinician: 11/15/2023   Last telemedicine visit with prescribing clinician: Visit date not found   Next office visit with prescribing clinician: 1/24/2024                         Would you like a call back once the refill request has been completed: [] Yes [] No    If the office needs to give you a call back, can they leave a voicemail: [] Yes [] No    Carl Kramer MA  01/05/24, 13:50 EST

## 2024-01-09 RX ORDER — BLOOD-GLUCOSE METER
1 EACH MISCELLANEOUS DAILY
Qty: 1 KIT | Refills: 0 | Status: SHIPPED | OUTPATIENT
Start: 2024-01-09

## 2024-01-09 RX ORDER — LOSARTAN POTASSIUM 50 MG/1
50 TABLET ORAL DAILY
Qty: 90 TABLET | Refills: 3 | Status: SHIPPED | OUTPATIENT
Start: 2024-01-09

## 2024-01-09 RX ORDER — LANCETS
EACH MISCELLANEOUS
Qty: 100 EACH | Refills: 12 | Status: SHIPPED | OUTPATIENT
Start: 2024-01-09

## 2024-01-09 NOTE — TELEPHONE ENCOUNTER
Called patient and she stated she is using a new pharmacy. She needs all medications sent to [facility]. She was unable to get her mounjaro with her old insurance but is able to afford it now with new insurance.

## 2024-01-16 DIAGNOSIS — E11.65 TYPE 2 DIABETES MELLITUS WITH HYPERGLYCEMIA, WITHOUT LONG-TERM CURRENT USE OF INSULIN: ICD-10-CM

## 2024-01-16 DIAGNOSIS — E11.9 TYPE 2 DIABETES MELLITUS WITHOUT COMPLICATION, WITHOUT LONG-TERM CURRENT USE OF INSULIN: ICD-10-CM

## 2024-01-16 NOTE — TELEPHONE ENCOUNTER
Caller: Cristiana Headley LATRICIA    Relationship: Self    Best call back number: [phone]    Requested Prescriptions:   Requested Prescriptions     Pending Prescriptions Disp Refills    Tirzepatide (Mounjaro) 2.5 MG/0.5ML solution pen-injector pen 2 mL 1     Sig: Inject 0.5 mL under the skin into the appropriate area as directed 1 (One) Time Per Week.    losartan (COZAAR) 50 MG tablet 90 tablet 3     Sig: Take 1 tablet by mouth Daily.    glipizide (GLUCOTROL XL) 5 MG ER tablet 180 tablet 2     Sig: Take 1 tablet by mouth 2 (Two) Times a Day.        Pharmacy where request should be sent: [facility] - [address] [phone] [facility] [phone]      Last office visit with prescribing clinician: 11/15/2023   Last telemedicine visit with prescribing clinician: Visit date not found   Next office visit with prescribing clinician: 3/5/2024     Additional details provided by patient: OUT OF MEDICATION.  [facility] CANCELED THE LAST PRESCRIPTION AS THEY NEEDED MORE INFO AND NO ONE GOT IN TOUCH WITH THEM.      Does the patient have less than a 3 day supply:  [x] Yes  [] No    Would you like a call back once the refill request has been completed: [] Yes [x] No    If the office needs to give you a call back, can they leave a voicemail: [] Yes [x] No    Marley Love Rep   01/16/24 13:03 EST

## 2024-01-17 RX ORDER — EZETIMIBE 10 MG/1
10 TABLET ORAL DAILY
Qty: 90 TABLET | Refills: 3 | Status: SHIPPED | OUTPATIENT
Start: 2024-01-17

## 2024-01-17 RX ORDER — GLIPIZIDE 5 MG/1
5 TABLET, FILM COATED, EXTENDED RELEASE ORAL 2 TIMES DAILY
Qty: 180 TABLET | Refills: 2 | Status: SHIPPED | OUTPATIENT
Start: 2024-01-17

## 2024-01-17 RX ORDER — LOSARTAN POTASSIUM 50 MG/1
50 TABLET ORAL DAILY
Qty: 90 TABLET | Refills: 3 | Status: SHIPPED | OUTPATIENT
Start: 2024-01-17

## 2024-01-17 NOTE — TELEPHONE ENCOUNTER
Rx Refill Note  Requested Prescriptions     Pending Prescriptions Disp Refills    losartan (COZAAR) 50 MG tablet 90 tablet 3     Sig: Take 1 tablet by mouth Daily.    glipizide (GLUCOTROL XL) 5 MG ER tablet 180 tablet 2     Sig: Take 1 tablet by mouth 2 (Two) Times a Day.    ezetimibe (Zetia) 10 MG tablet 90 tablet 3     Sig: Take 1 tablet by mouth Daily.      Last office visit with prescribing clinician: 11/15/2023   Last telemedicine visit with prescribing clinician: Visit date not found   Next office visit with prescribing clinician: 3/5/2024                         Would you like a call back once the refill request has been completed: [] Yes [] No    If the office needs to give you a call back, can they leave a voicemail: [] Yes [] No    Carl Kramer MA  01/17/24, 10:25 EST

## 2024-02-06 NOTE — SIGNIFICANT NOTE
Education provided the Patient on the following:    - Nothing to Eat or Drink after MN the night before the procedure    - Avoid red/purple fluids while completing their bowel prep as ordered by physician  -Contact Gastrointerologist office for any questions about specific details regarding colon prep    -You will need to have someone drive you home after your colonoscopy and remain with you for 24 hours after the procedure  - The date of your procedure, your are welcome to have one visitor at bedside or remain within 10-15 minutes of [facility] [place]  -Please wear warm socks when you arrive for your procedure  -Remove all jewelry and leave any valuables before arriving the day of your procedure (all will have to be removed before leaving preop)  -You will need to arrive at 0730 on 2/8 procedure    -Feel free to contact us at: [phone] with any additional questions/concerns

## 2024-02-08 ENCOUNTER — ANESTHESIA (OUTPATIENT)
Dept: SURGERY | Facility: SURGERY CENTER | Age: 69
End: 2024-02-08
Payer: MEDICARE

## 2024-02-08 ENCOUNTER — ANESTHESIA EVENT (OUTPATIENT)
Dept: SURGERY | Facility: SURGERY CENTER | Age: 69
End: 2024-02-08
Payer: MEDICARE

## 2024-02-08 ENCOUNTER — HOSPITAL ENCOUNTER (OUTPATIENT)
Facility: SURGERY CENTER | Age: 69
Setting detail: HOSPITAL OUTPATIENT SURGERY
Discharge: HOME | End: 2024-02-08
Attending: INTERNAL MEDICINE | Admitting: INTERNAL MEDICINE
Payer: MEDICARE

## 2024-02-08 VITALS
RESPIRATION RATE: 16 BRPM | OXYGEN SATURATION: 94 % | SYSTOLIC BLOOD PRESSURE: 121 MMHG | BODY MASS INDEX: 33.13 KG/M2 | WEIGHT: 180 LBS | HEIGHT: 62 IN | HEART RATE: 73 BPM | TEMPERATURE: 98 F | DIASTOLIC BLOOD PRESSURE: 75 MMHG

## 2024-02-08 DIAGNOSIS — K74.60 HEPATIC CIRRHOSIS, UNSPECIFIED HEPATIC CIRRHOSIS TYPE, UNSPECIFIED WHETHER ASCITES PRESENT: ICD-10-CM

## 2024-02-08 DIAGNOSIS — K63.5 POLYP OF COLON, UNSPECIFIED PART OF COLON, UNSPECIFIED TYPE: ICD-10-CM

## 2024-02-08 DIAGNOSIS — Z12.11 ENCOUNTER FOR SCREENING FOR MALIGNANT NEOPLASM OF COLON: ICD-10-CM

## 2024-02-08 LAB — GLUCOSE BLDC GLUCOMTR-MCNC: 104 MG/DL (ref 70–130)

## 2024-02-08 PROCEDURE — G0105 COLORECTAL SCRN; HI RISK IND: HCPCS | Performed by: INTERNAL MEDICINE

## 2024-02-08 PROCEDURE — 25010000002 LIDOCAINE 1 % SOLUTION: Performed by: ANESTHESIOLOGY

## 2024-02-08 PROCEDURE — 25010000002 PROPOFOL 10 MG/ML EMULSION: Performed by: ANESTHESIOLOGY

## 2024-02-08 PROCEDURE — 43239 EGD BIOPSY SINGLE/MULTIPLE: CPT | Performed by: INTERNAL MEDICINE

## 2024-02-08 PROCEDURE — 88305 TISSUE EXAM BY PATHOLOGIST: CPT | Performed by: INTERNAL MEDICINE

## 2024-02-08 PROCEDURE — 25810000003 LACTATED RINGERS PER 1000 ML: Performed by: NURSE PRACTITIONER

## 2024-02-08 PROCEDURE — 88342 IMHCHEM/IMCYTCHM 1ST ANTB: CPT | Performed by: INTERNAL MEDICINE

## 2024-02-08 PROCEDURE — 87081 CULTURE SCREEN ONLY: CPT | Performed by: INTERNAL MEDICINE

## 2024-02-08 PROCEDURE — 88312 SPECIAL STAINS GROUP 1: CPT | Performed by: INTERNAL MEDICINE

## 2024-02-08 PROCEDURE — 25010000002 PHENYLEPHRINE 10 MG/ML SOLUTION: Performed by: ANESTHESIOLOGY

## 2024-02-08 RX ORDER — SODIUM CHLORIDE, SODIUM LACTATE, POTASSIUM CHLORIDE, CALCIUM CHLORIDE 600; 310; 30; 20 MG/100ML; MG/100ML; MG/100ML; MG/100ML
30 INJECTION, SOLUTION INTRAVENOUS CONTINUOUS PRN
Status: DISCONTINUED | OUTPATIENT
Start: 2024-02-08 | End: 2024-02-08 | Stop reason: HOSPADM

## 2024-02-08 RX ORDER — SODIUM CHLORIDE 0.9 % (FLUSH) 0.9 %
3 SYRINGE (ML) INJECTION EVERY 12 HOURS SCHEDULED
Status: DISCONTINUED | OUTPATIENT
Start: 2024-02-08 | End: 2024-02-08 | Stop reason: HOSPADM

## 2024-02-08 RX ORDER — PHENYLEPHRINE HYDROCHLORIDE 10 MG/ML
INJECTION INTRAVENOUS AS NEEDED
Status: DISCONTINUED | OUTPATIENT
Start: 2024-02-08 | End: 2024-02-08 | Stop reason: SURG

## 2024-02-08 RX ORDER — SODIUM CHLORIDE 0.9 % (FLUSH) 0.9 %
10 SYRINGE (ML) INJECTION AS NEEDED
Status: DISCONTINUED | OUTPATIENT
Start: 2024-02-08 | End: 2024-02-08 | Stop reason: HOSPADM

## 2024-02-08 RX ORDER — LIDOCAINE HYDROCHLORIDE 10 MG/ML
INJECTION, SOLUTION INFILTRATION; PERINEURAL AS NEEDED
Status: DISCONTINUED | OUTPATIENT
Start: 2024-02-08 | End: 2024-02-08 | Stop reason: SURG

## 2024-02-08 RX ORDER — MAGNESIUM HYDROXIDE 1200 MG/15ML
LIQUID ORAL AS NEEDED
Status: DISCONTINUED | OUTPATIENT
Start: 2024-02-08 | End: 2024-02-08 | Stop reason: HOSPADM

## 2024-02-08 RX ADMIN — PHENYLEPHRINE HYDROCHLORIDE 100 MCG: 10 INJECTION INTRAVENOUS at 08:44

## 2024-02-08 RX ADMIN — LIDOCAINE HYDROCHLORIDE 50 MG: 10 INJECTION, SOLUTION INFILTRATION; PERINEURAL at 08:27

## 2024-02-08 RX ADMIN — SODIUM CHLORIDE, POTASSIUM CHLORIDE, SODIUM LACTATE AND CALCIUM CHLORIDE 30 ML/HR: 600; 310; 30; 20 INJECTION, SOLUTION INTRAVENOUS at 07:50

## 2024-02-08 RX ADMIN — PROPOFOL 120 MCG/KG/MIN: 10 INJECTION, EMULSION INTRAVENOUS at 08:27

## 2024-02-08 NOTE — H&P
No chief complaint on file.      HPI  Patient today for an EGD to evaluate for varices and a colonoscopy for screening.  She has a history of colon polyps.         Problem List:    Patient Active Problem List   Diagnosis    Cervical radiculopathy    Diabetes mellitus    Hypertension    Neck pain    Shoulder pain    Chronic cough    Routine health maintenance    Chest pain    Polyp of colon    Liver mass    Obesity (BMI 30-39.9)    Kidney stones    Dupuytren's contracture    Hepatic cirrhosis    Encounter for screening for malignant neoplasm of colon       Medical History:       Social History:    Social History     Socioeconomic History    Marital status:    Tobacco Use    Smoking status: Never    Smokeless tobacco: Never   Vaping Use    Vaping Use: Never used   Substance and Sexual Activity    Alcohol use: Never    Drug use: Never    Sexual activity: Not Currently       Family History:   Family History   Problem Relation Age of Onset    Ulcerative colitis Mother     Irritable bowel syndrome Mother     Colon polyps Mother     Anxiety disorder Mother     Depression Mother     Diabetes Mother         TYPE 2    Prostate cancer Father     Asthma Maternal Aunt     Arthritis Maternal Uncle         RA    COPD Maternal Uncle         smoking    Heart disease Maternal Uncle         A FIB    Heart disease Maternal Uncle         A FIB    Cancer Maternal Grandmother         smoking    Malig Hyperthermia Neg Hx     Breast cancer Neg Hx     Colon cancer Neg Hx     Crohn's disease Neg Hx        Surgical History:   Past Surgical History:   Procedure Laterality Date    ABDOMINOPLASTY      BILATERAL BREAST REDUCTION      X2    BREAST SURGERY  breast reduction    CHOLECYSTECTOMY      COLONOSCOPY      COSMETIC SURGERY  tummy    EXPLORATORY LAPAROTOMY      X2    HEMORRHOIDECTOMY      JOINT REPLACEMENT  right hip 2 yrs    KIDNEY STONE SURGERY      LAPAROSCOPIC TUBAL LIGATION      PARTIAL HYMENECTOMY      POLYPECTOMY      X2     REDUCTION MAMMAPLASTY      TONSILLECTOMY      TOTAL ABDOMINAL HYSTERECTOMY      TOTAL HIP ARTHROPLASTY Right 2021    URETEROSCOPY LASER LITHOTRIPSY WITH STENT INSERTION Left 04/22/2022    Procedure: CYSTOSCOPY, LEFT URETEROSCOPY WITH RETROGRADE PYELOGRAM, STONE BASKET EXTRACTION AND STENT PLACEMENT;  Surgeon: Wilbert Flowers MD;  Location: [facility];  Service: Urology;  Laterality: Left;         Current Facility-Administered Medications:     lactated ringers infusion, 30 mL/hr, Intravenous, Continuous PRN, Gaston, Laurie G, APRN    sodium chloride 0.9 % flush 10 mL, 10 mL, Intravenous, PRN, Gaston, Laurie G, APRN    sodium chloride 0.9 % flush 3 mL, 3 mL, Intravenous, Q12H, Gaston, Laurie G, APRN    Allergies: No Known Allergies     The following portions of the patient's history were reviewed by me and updated as appropriate: review of systems, allergies, current medications, past family history, past medical history, past social history, past surgical history and problem list.    There were no vitals filed for this visit.    PHYSICAL EXAM:    CONSTITUTIONAL:  today's vital signs reviewed by me  GASTROINTESTINAL: abdomen is soft nontender nondistended with normal active bowel sounds, no masses are appreciated    Assessment/ Plan  Will proceed today with EGD and colonoscopy.    Risks and benefits as well as alternatives to endoscopic evaluation were explained to the patient and they voiced understanding and wish to proceed.  These risks include but are not limited to the risk of bleeding, perforation, adverse reaction to sedation, and missed lesions.  The patient was given the opportunity to ask questions prior to the endoscopic procedure.

## 2024-02-08 NOTE — ANESTHESIA PREPROCEDURE EVALUATION
" Anesthesia Evaluation     Patient summary reviewed and Nursing notes reviewed   no history of anesthetic complications:   NPO Solid Status: > 8 hours  NPO Liquid Status: > 2 hours           Airway   Mallampati: II  Dental      Pulmonary    (+) asthma,  Cardiovascular   Exercise tolerance: good (4-7 METS)    (+) hypertension, hyperlipidemia      Neuro/Psych  (+) numbness, psychiatric history Anxiety  GI/Hepatic/Renal/Endo    (+) obesity, morbid obesity, liver disease fatty liver disease, renal disease-, diabetes mellitus    Musculoskeletal     (+) back pain, neck pain, radiculopathy  Abdominal    Substance History - negative use     OB/GYN negative ob/gyn ROS         Other                    Anesthesia Plan    ASA 3     MAC     (/63 (BP Location: Left arm, Patient Position: Lying)   Pulse 82   Temp 36.1 °C (97 °F) (Temporal)   Resp 16   Ht 157.5 cm (62\")   Wt 81.6 kg (180 lb)   SpO2 98%   BMI 32.92 kg/m²     I have reviewed the patient's history with the patient and the chart, including all pertinent laboratory results and imaging. I have explained the risks of anesthesia including but not limited to dental damage, corneal abrasion, nerve injury, MI, stroke, and death.    )  intravenous induction     Anesthetic plan, risks, benefits, and alternatives have been provided, discussed and informed consent has been obtained with: patient.    CODE STATUS:         "

## 2024-02-08 NOTE — ANESTHESIA POSTPROCEDURE EVALUATION
"Patient: Cristiana Headley    Procedure Summary       Date: 02/08/24 Room / Location: [facility]    Anesthesia Start: 0823 Anesthesia Stop: 0846    Procedures:       ESOPHAGOGASTRODUODENOSCOPY WITH BIOPSY AND CLOTEST      COLONOSCOPY TO CECUM FOR SCREENING Diagnosis:       Hepatic cirrhosis, unspecified hepatic cirrhosis type, unspecified whether ascites present      Polyp of colon, unspecified part of colon, unspecified type      Encounter for screening for malignant neoplasm of colon      (Hepatic cirrhosis, unspecified hepatic cirrhosis type, unspecified whether ascites present [K74.60])      (Polyp of colon, unspecified part of colon, unspecified type [K63.5])      (Encounter for screening for malignant neoplasm of colon [Z12.11])    Surgeons: Jeff Jacome MD Provider: Allegra Florian MD    Anesthesia Type: MAC ASA Status: 3            Anesthesia Type: MAC    Vitals  Vitals Value Taken Time   BP 89/54 02/08/24 0847   Temp 36.7 °C (98 °F) 02/08/24 0847   Pulse 85 02/08/24 0847   Resp 16 02/08/24 0847   SpO2 98 % 02/08/24 0847           Post Anesthesia Care and Evaluation    Patient location during evaluation: bedside  Patient participation: complete - patient participated  Level of consciousness: awake  Pain management: adequate    Airway patency: patent  Anesthetic complications: No anesthetic complications  PONV Status: controlled  Cardiovascular status: acceptable  Respiratory status: acceptable  Hydration status: acceptable    Comments: BP (!) 89/54 (BP Location: Left arm, Patient Position: Lying)   Pulse 85   Temp 36.7 °C (98 °F) (Temporal)   Resp 16   Ht 157.5 cm (62\")   Wt 81.6 kg (180 lb)   SpO2 98%   BMI 32.92 kg/m²       "

## 2024-02-09 LAB — UREASE TISS QL: NEGATIVE

## 2024-02-10 LAB
LAB AP CASE REPORT: NORMAL
LAB AP CLINICAL INFORMATION: NORMAL
PATH REPORT.ADDENDUM SPEC: NORMAL
PATH REPORT.FINAL DX SPEC: NORMAL
PATH REPORT.GROSS SPEC: NORMAL

## 2024-02-13 DIAGNOSIS — K21.00 GASTROESOPHAGEAL REFLUX DISEASE WITH ESOPHAGITIS WITHOUT HEMORRHAGE: Primary | ICD-10-CM

## 2024-02-13 DIAGNOSIS — B37.81 CANDIDAL ESOPHAGITIS: ICD-10-CM

## 2024-02-13 RX ORDER — PANTOPRAZOLE SODIUM 40 MG/1
40 TABLET, DELAYED RELEASE ORAL DAILY
Qty: 90 TABLET | Refills: 3 | Status: SHIPPED | OUTPATIENT
Start: 2024-02-13

## 2024-02-13 RX ORDER — FLUCONAZOLE 100 MG/1
TABLET ORAL
Qty: 23 TABLET | Refills: 0 | Status: SHIPPED | OUTPATIENT
Start: 2024-02-13

## 2024-03-05 ENCOUNTER — OFFICE VISIT (OUTPATIENT)
Dept: FAMILY MEDICINE CLINIC | Facility: CLINIC | Age: 69
End: 2024-03-05
Payer: MEDICARE

## 2024-03-05 VITALS
WEIGHT: 183.4 LBS | HEART RATE: 87 BPM | BODY MASS INDEX: 33.75 KG/M2 | SYSTOLIC BLOOD PRESSURE: 128 MMHG | HEIGHT: 62 IN | OXYGEN SATURATION: 95 % | DIASTOLIC BLOOD PRESSURE: 76 MMHG

## 2024-03-05 DIAGNOSIS — Z00.00 ROUTINE HEALTH MAINTENANCE: ICD-10-CM

## 2024-03-05 DIAGNOSIS — K76.89 HEPATIC CYST: ICD-10-CM

## 2024-03-05 DIAGNOSIS — Z11.59 ENCOUNTER FOR SCREENING FOR OTHER VIRAL DISEASES: ICD-10-CM

## 2024-03-05 DIAGNOSIS — E11.9 TYPE 2 DIABETES MELLITUS WITHOUT COMPLICATION, WITHOUT LONG-TERM CURRENT USE OF INSULIN: ICD-10-CM

## 2024-03-05 DIAGNOSIS — I10 PRIMARY HYPERTENSION: ICD-10-CM

## 2024-03-05 DIAGNOSIS — R07.9 CHEST PAIN, UNSPECIFIED TYPE: ICD-10-CM

## 2024-03-05 DIAGNOSIS — K74.60 HEPATIC CIRRHOSIS, UNSPECIFIED HEPATIC CIRRHOSIS TYPE, UNSPECIFIED WHETHER ASCITES PRESENT: ICD-10-CM

## 2024-03-05 DIAGNOSIS — Z00.00 MEDICARE ANNUAL WELLNESS VISIT, SUBSEQUENT: Primary | ICD-10-CM

## 2024-03-05 DIAGNOSIS — B37.81 CANDIDAL ESOPHAGITIS: ICD-10-CM

## 2024-03-05 DIAGNOSIS — Z01.84 IMMUNITY STATUS TESTING: ICD-10-CM

## 2024-03-05 NOTE — ASSESSMENT & PLAN NOTE
Finishing up Diflucan. PPI really helping her sxs. Needs GI appt, does not have appt scheduled yet, will reach out to GI about this as well.

## 2024-03-05 NOTE — Clinical Note
Hi Dr. Jacome,   I saw Cristiana today in follow up. I have a few questions for you pertaining to her care:  1. She states Dr. Hurd recommended q 6month liver ultrasounds. Will the GI service be ordering those or do I need to? 2. Given chronic elevation in AST/ALT, what are your thoughts on her taking a statin for her HLD in the setting of diabetes? 3. She is finishing up Diflucan for candidal esophagitis. It appears she is supposed to follow up in GI clinic but has not heard back about an appt. Will someone in her office be reaching out to her?  Thanks so much,  Sabra Guadarrama

## 2024-03-05 NOTE — ASSESSMENT & PLAN NOTE
Rather large, over 10 cm.  Patient is asymptomatic.  She was evaluated by Dr. Zhang Hurd who recommended monitoring liver ultrasound every 6 months.  I will reach out to GI to see if they prefer to order the ultrasound.

## 2024-03-05 NOTE — ASSESSMENT & PLAN NOTE
Controlled on Losartan, continue  Renal function and K acceptable 12/2023  Comprehensive Metabolic Panel (12/12/2023 15:05)

## 2024-03-05 NOTE — ASSESSMENT & PLAN NOTE
"A1c: Today  Dilated Eye Exam: UTD, recent and upcoming cataract surgery  Urine microalbumin: Microalbumin / Creatinine Urine Ratio - Urine, Clean Catch (11/15/2023 08:47)   On ACE/ARB? Yes  Statin: No, on Zetia due to elevated LFTs. Can send a note to GI to get thoughts on statin given chronic LFT elevation  Pneumococcal vaccination: UTD     Remains on metformin 1000 mg twice a day, glipizide 5 mg extended release twice daily, and Mounjaro 5 mg weekly, with plans to increase to 7.5 mg weekly.  Patient states she still does not feel full while on the Mounjaro.  She admits her diet is \"horrible\" and that she needs to improve it.  She previously has been referred to a dietitian.  I also recommended increasing exercise.  Will get A1c and we will see where we stand.  She is also wondering if she would be a candidate for Dexcom.  Referred to dietician.   "

## 2024-03-05 NOTE — ASSESSMENT & PLAN NOTE
Follows with GI. States Dr. Hurd noted that perhaps she does not have cirrhosis. Either way, she does have chronic liver inflammation as evidenced by elevated LFTs and has large liver cyst. Check HBV immunity status to see if we need to vaccinate against HBV.

## 2024-03-05 NOTE — PATIENT INSTRUCTIONS
Recommended vaccines:  Shingrix (shingles)  Tdap  COVID  RSV    150 minutes exercise per week!  Will call you with lab results  Bringing completed living will to [facility]

## 2024-03-05 NOTE — ASSESSMENT & PLAN NOTE
COLONOSCOPY (02/08/2024 08:15) Repeat 2029  Gave pt a list of recommended vaccines to get at a local pharmacy  DEXA Bone Density Axial (12/01/2023 14:45) Osteopenia, due 12/2025  Mammo Screening Digital Tomosynthesis Bilateral With CAD (11/08/2023 14:23) Due 11/2024  Discussed and recommended filling out living will

## 2024-03-06 LAB
HBA1C MFR BLD: 7.3 % (ref 4.8–5.6)
HBV SURFACE AB SER QL: NON REACTIVE

## 2024-05-03 ENCOUNTER — TELEPHONE (OUTPATIENT)
Dept: FAMILY MEDICINE CLINIC | Facility: CLINIC | Age: 69
End: 2024-05-03

## 2024-05-03 DIAGNOSIS — E11.65 TYPE 2 DIABETES MELLITUS WITH HYPERGLYCEMIA, WITHOUT LONG-TERM CURRENT USE OF INSULIN: Primary | ICD-10-CM

## 2024-05-03 RX ORDER — SEMAGLUTIDE 1.34 MG/ML
0.25 INJECTION, SOLUTION SUBCUTANEOUS WEEKLY
Qty: 1.5 ML | Refills: 0 | Status: SHIPPED | OUTPATIENT
Start: 2024-05-03 | End: 2024-05-25

## 2024-05-03 RX ORDER — SEMAGLUTIDE 1.34 MG/ML
0.5 INJECTION, SOLUTION SUBCUTANEOUS WEEKLY
Qty: 1.5 ML | Refills: 0 | Status: SHIPPED | OUTPATIENT
Start: 2024-05-03 | End: 2024-05-25

## 2024-05-03 NOTE — TELEPHONE ENCOUNTER
Pharmacy Name:  [facility] - [place], [place] - [address] - [phone] [facility] [phone]     Reference Number (if applicable): [id number]    Pharmacy representative name: MELANIE    Pharmacy representative phone number: [phone]     What medication are you calling in regards to: OZEMPIC AND MOUNJARO    What question does the pharmacy have: REQUESTING CURRENT STRENGTH FOR MOUNJARO    BUT THEY ALSO HAVE A LOT OF PRESCRIPTIONS FOR OZEMPIC    PLEASE CLARIFY

## 2024-06-07 DIAGNOSIS — E11.65 TYPE 2 DIABETES MELLITUS WITH HYPERGLYCEMIA, WITHOUT LONG-TERM CURRENT USE OF INSULIN: ICD-10-CM

## 2024-06-10 RX ORDER — SEMAGLUTIDE 1.34 MG/ML
1 INJECTION, SOLUTION SUBCUTANEOUS WEEKLY
Qty: 6 ML | Refills: 5 | Status: SHIPPED | OUTPATIENT
Start: 2024-06-10

## 2024-06-10 NOTE — TELEPHONE ENCOUNTER
Rx Refill Note  Requested Prescriptions     Pending Prescriptions Disp Refills    Ozempic, 1 MG/DOSE, 4 MG/3ML solution pen-injector [Pharmacy Med Name: Ozempic (1 MG/DOSE) 4 MG/3ML Subcutaneous Solution Pen-injector] 6 mL 5     Sig: INJECT 1 MG SUBCUTANEOUSLY INTO  THE APPROPRIATE AREA AS DIRECTED ONCE WEEKLY . TAKE AFTER YOU  FINISH 4 WEEKS OF THE 0.5 MG  WEEKLY DOSING      Last office visit with prescribing clinician: 3/5/2024   Last telemedicine visit with prescribing clinician: Visit date not found   Next office visit with prescribing clinician: 6/25/2024                         Would you like a call back once the refill request has been completed: [] Yes [] No    If the office needs to give you a call back, can they leave a voicemail: [] Yes [] No    Carl Kramer MA  06/10/24, 10:54 EDT

## 2024-06-28 ENCOUNTER — OFFICE VISIT (OUTPATIENT)
Dept: FAMILY MEDICINE CLINIC | Facility: CLINIC | Age: 69
End: 2024-06-28
Payer: MEDICARE

## 2024-06-28 VITALS
WEIGHT: 180.4 LBS | BODY MASS INDEX: 33.2 KG/M2 | HEART RATE: 85 BPM | SYSTOLIC BLOOD PRESSURE: 128 MMHG | HEIGHT: 62 IN | OXYGEN SATURATION: 98 % | DIASTOLIC BLOOD PRESSURE: 74 MMHG

## 2024-06-28 DIAGNOSIS — I10 PRIMARY HYPERTENSION: ICD-10-CM

## 2024-06-28 DIAGNOSIS — K74.60 HEPATIC CIRRHOSIS, UNSPECIFIED HEPATIC CIRRHOSIS TYPE, UNSPECIFIED WHETHER ASCITES PRESENT: ICD-10-CM

## 2024-06-28 DIAGNOSIS — E78.5 HYPERLIPIDEMIA, UNSPECIFIED HYPERLIPIDEMIA TYPE: ICD-10-CM

## 2024-06-28 DIAGNOSIS — R07.9 CHEST PAIN, UNSPECIFIED TYPE: ICD-10-CM

## 2024-06-28 DIAGNOSIS — B37.81 CANDIDAL ESOPHAGITIS: ICD-10-CM

## 2024-06-28 DIAGNOSIS — E11.65 TYPE 2 DIABETES MELLITUS WITH HYPERGLYCEMIA, UNSPECIFIED WHETHER LONG TERM INSULIN USE: Primary | ICD-10-CM

## 2024-06-28 PROCEDURE — 3051F HG A1C>EQUAL 7.0%<8.0%: CPT | Performed by: INTERNAL MEDICINE

## 2024-06-28 PROCEDURE — 3074F SYST BP LT 130 MM HG: CPT | Performed by: INTERNAL MEDICINE

## 2024-06-28 PROCEDURE — 1159F MED LIST DOCD IN RCRD: CPT | Performed by: INTERNAL MEDICINE

## 2024-06-28 PROCEDURE — 1160F RVW MEDS BY RX/DR IN RCRD: CPT | Performed by: INTERNAL MEDICINE

## 2024-06-28 PROCEDURE — 99214 OFFICE O/P EST MOD 30 MIN: CPT | Performed by: INTERNAL MEDICINE

## 2024-06-28 PROCEDURE — 3078F DIAST BP <80 MM HG: CPT | Performed by: INTERNAL MEDICINE

## 2024-06-28 PROCEDURE — 93000 ELECTROCARDIOGRAM COMPLETE: CPT | Performed by: INTERNAL MEDICINE

## 2024-06-28 NOTE — ASSESSMENT & PLAN NOTE
Saw cardiology back in the fall, had a normal stress test. Unfortunately, she had two more episodes of chest pain recently, most recently about 3 weeks ago.  I advised her to go to the emergency department to get it checked out if it happens again.  Even with a negative stress test, she does have a high pretest probability of having coronary artery disease.  I recommended she make a sooner appointment with cardiology, and I also sent Dr. Bear in a message.  I did obtain an EKG today to see if there were any changes from old ones, but I did not see any.  The EKG was compared to EKG from October 2023, showed normal sinus rhythm, normal axis, no blocks, no ischemia

## 2024-06-28 NOTE — ASSESSMENT & PLAN NOTE
Follows with GI. States Dr. Hurd noted that perhaps she does not have cirrhosis. Either way, she does have chronic liver inflammation as evidenced by elevated LFTs and has large liver cyst. Check CMP today.

## 2024-06-28 NOTE — PATIENT INSTRUCTIONS
[facility] will be reaching out to you soon about finding a new PCP to continue your medical care. If you do not hear back within 30 days, please call our office.     Dr. Thierry Long Dr., Dr. Lola Saha

## 2024-06-28 NOTE — PROGRESS NOTES
"Chief Complaint  Follow-up and Chest Pain (Chest pain a month ago at night. Has only happened twice. )    Subjective        HPI   Cristiana presents to [facility] for follow up.    She has had two episodes of chest pain this month, most recently about 3 weeks ago.  Both episodes lasted about 20 minutes  Already taking pantoprazole  \"It was pretty hard\"  Radiated to her back  Had an episode of leg swelling as well, wearing compression stockings now  Wonders if stress-related in setting of recent move  Sometimes short of breath, new in the last month but not daily  No diaphoresis, palpitations  No exacerbating or alleviating factors    DM2: Had to be switched to Ozempic due to drug shortages. On Metformin and Glipizide too.   Hates the Ozempic, doesn't help appetite, does not feel full on it  Never got nausea on Ozempic and Mounjaro  Wants to switch back to Mounjaro if possible  Doesn't check blood sugars    Hx candida esophagitits: No GI follow up, pt cancelled appt  Plans to make appt with GI      Objective   Vital Signs:  Vitals:    06/28/24 0933   BP: 128/74   BP Location: Right arm   Patient Position: Sitting   Cuff Size: Large Adult   Pulse: 85   SpO2: 98%   Weight: 81.8 kg (180 lb 6.4 oz)   Height: 157.5 cm (62\")          Physical Exam  Constitutional:       General: She is not in acute distress.     Appearance: Normal appearance.   HENT:      Head: Normocephalic and atraumatic.   Eyes:      Conjunctiva/sclera: Conjunctivae normal.   Cardiovascular:      Rate and Rhythm: Normal rate and regular rhythm.      Heart sounds: No murmur heard.     No gallop.   Pulmonary:      Effort: Pulmonary effort is normal. No respiratory distress.      Breath sounds: No wheezing or rhonchi.   Musculoskeletal:         General: No swelling or deformity.   Skin:     Coloration: Skin is not jaundiced.      Findings: No rash.   Neurological:      General: No focal deficit present.      Mental Status: She " is alert and oriented to person, place, and time.   Psychiatric:         Mood and Affect: Mood normal.         Behavior: Behavior normal.         Judgment: Judgment normal.          Result Review :                Assessment and Plan    Diagnoses and all orders for this visit:    1. Type 2 diabetes mellitus with hyperglycemia, unspecified whether long term insulin use (Primary)  Assessment & Plan:  A1c: Today  Dilated Eye Exam: UTD, recent and upcoming cataract surgery this year  Urine microalbumin: Microalbumin / Creatinine Urine Ratio - Urine, Clean Catch (11/15/2023 08:47)   On ACE/ARB? Yes  Statin: No, on Zetia due to elevated LFTs. Could consider Repatha versus seeing if GI is OK w statin  Pneumococcal vaccination: UTD     Remains on metformin 1000 mg twice a day, glipizide 5 mg extended release twice daily, and Ozempic 1 mg weekly.  We changed her from Mounjaro to Ozempic due to drug shortages, but she really does not feel like the Ozempic is taking away the food noise or causing satiety.  She would like to try Mounjaro again.  There is no equivalent dosing between Ozempic and Mounjaro, but she has never had side effects on either Ozempic and Mounjaro.  I am going to send in Mounjaro 7.5 mg to start, then can increase dose from there.      Orders:  -     Hemoglobin A1c  -     Tirzepatide (MOUNJARO) 7.5 MG/0.5ML solution pen-injector pen; Inject 0.5 mL under the skin into the appropriate area as directed 1 (One) Time Per Week.  Dispense: 2 mL; Refill: 0  -     Tirzepatide (MOUNJARO) 10 MG/0.5ML solution pen-injector pen; Inject 0.5 mL under the skin into the appropriate area as directed 1 (One) Time Per Week. Take after finishing up the 7.5 mg prescription  Dispense: 2 mL; Refill: 2    2. Primary hypertension  Assessment & Plan:  Controlled on Losartan, continue  Labs today      3. Hepatic cirrhosis, unspecified hepatic cirrhosis type, unspecified whether ascites present  Assessment & Plan:  Follows with GI.  States Dr. Hurd noted that perhaps she does not have cirrhosis. Either way, she does have chronic liver inflammation as evidenced by elevated LFTs and has large liver cyst. Check CMP today.     Orders:  -     Comprehensive Metabolic Panel  -     CBC (No Diff)    4. Hyperlipidemia, unspecified hyperlipidemia type  -     Lipid Panel    5. Chest pain, unspecified type  Assessment & Plan:  Saw cardiology back in the fall, had a normal stress test. Unfortunately, she had two more episodes of chest pain recently, most recently about 3 weeks ago.  I advised her to go to the emergency department to get it checked out if it happens again.  Even with a negative stress test, she does have a high pretest probability of having coronary artery disease.  I recommended she make a sooner appointment with cardiology, and I also sent Dr. Bear in a message.  I did obtain an EKG today to see if there were any changes from old ones, but I did not see any.  The EKG was compared to EKG from October 2023, showed normal sinus rhythm, normal axis, no blocks, no ischemia    Orders:  -     ECG 12 Lead    6. Candidal esophagitis  Assessment & Plan:  Advised patient to make follow-up appointment with gastroenterology, particularly in light of recent chest pain episode          Follow Up   Return in about 2 months (around 9/9/2024) for Recheck, Next scheduled follow up.  Patient was given instructions and counseling regarding her condition or for health maintenance advice. Please see specific information pulled into the AVS if appropriate.     I notified the patient that I will be transitioning out of my role as a primary care physician at [facility] effective early September. Follow up is recommended in 2 months. Patient was instructed that [facility] will be contacting them in the near future to assist in acquiring a new PCP for continued medical care.

## 2024-06-28 NOTE — ASSESSMENT & PLAN NOTE
Advised patient to make follow-up appointment with gastroenterology, particularly in light of recent chest pain episode

## 2024-06-28 NOTE — ASSESSMENT & PLAN NOTE
A1c: Today  Dilated Eye Exam: UTD, recent and upcoming cataract surgery this year  Urine microalbumin: Microalbumin / Creatinine Urine Ratio - Urine, Clean Catch (11/15/2023 08:47)   On ACE/ARB? Yes  Statin: No, on Zetia due to elevated LFTs. Could consider Repatha versus seeing if GI is OK w statin  Pneumococcal vaccination: UTD     Remains on metformin 1000 mg twice a day, glipizide 5 mg extended release twice daily, and Ozempic 1 mg weekly.  We changed her from Mounjaro to Ozempic due to drug shortages, but she really does not feel like the Ozempic is taking away the food noise or causing satiety.  She would like to try Mounjaro again.  There is no equivalent dosing between Ozempic and Mounjaro, but she has never had side effects on either Ozempic and Mounjaro.  I am going to send in Mounjaro 7.5 mg to start, then can increase dose from there.

## 2024-06-28 NOTE — Clinical Note
Cristiana Weldon Dr. followed up with me today. She mentioned two episodes (last one about three weeks ago) of chest pain that awakened her in the middle of the night). I got an EKG to see if there were any changes from prior and it looked OK to me. However, I remain concerned with the new symptoms and was wondering if you could see her sooner. Would this be able to be arranged? I have also advised her to follow up with GI in case this was something like an esophageal spasm.   Thanks! Sabra

## 2024-07-09 ENCOUNTER — PATIENT MESSAGE (OUTPATIENT)
Dept: FAMILY MEDICINE CLINIC | Facility: CLINIC | Age: 69
End: 2024-07-09
Payer: MEDICARE

## 2024-07-09 ENCOUNTER — TELEPHONE (OUTPATIENT)
Dept: FAMILY MEDICINE CLINIC | Facility: CLINIC | Age: 69
End: 2024-07-09

## 2024-07-09 DIAGNOSIS — E11.65 TYPE 2 DIABETES MELLITUS WITH HYPERGLYCEMIA, WITHOUT LONG-TERM CURRENT USE OF INSULIN: Primary | ICD-10-CM

## 2024-07-09 NOTE — TELEPHONE ENCOUNTER
Caller: Cristiana Headley    Relationship: Self    Best call back number: [phone]    What is the medical concern/diagnosis: DIABETES    What specialty or service is being requested: ENDO    What is the provider, practice or medical service name: DR. CANDIDO ADRIAN      What is the office phone number: [phone]

## 2024-07-12 ENCOUNTER — TELEPHONE (OUTPATIENT)
Dept: CARDIOLOGY | Facility: CLINIC | Age: 69
End: 2024-07-12

## 2024-07-12 NOTE — TELEPHONE ENCOUNTER
Caller: Cristiana Headley I    Relationship to patient: Self    Best call back number: [phone]    Type of visit: FOLLOW UP    Additional notes: PT IS CALLING TO SEE IF SHE CAN GET IN SOONER BECAUSE SHE HAS HAD CHEST DISCOMFORT ON AND OFF AND SWELLING IN HER LEGS. SHE SAID SHE'S NOT CURRENTLY HAVING ISSUES.

## 2024-07-17 ENCOUNTER — TELEPHONE (OUTPATIENT)
Dept: FAMILY MEDICINE CLINIC | Facility: CLINIC | Age: 69
End: 2024-07-17

## 2024-07-17 DIAGNOSIS — E11.9 TYPE 2 DIABETES MELLITUS WITHOUT COMPLICATION, WITHOUT LONG-TERM CURRENT USE OF INSULIN: ICD-10-CM

## 2024-07-17 RX ORDER — GLIPIZIDE 5 MG/1
5 TABLET, FILM COATED, EXTENDED RELEASE ORAL 2 TIMES DAILY
Qty: 180 TABLET | Refills: 1 | Status: SHIPPED | OUTPATIENT
Start: 2024-07-17

## 2024-07-17 NOTE — TELEPHONE ENCOUNTER
Caller: [facility] Best call back number: [phone]     What form or medical record are you requesting: MOST RECENT EKG    Who is requesting this form or medical record from you: [facility]    How would you like to receive the form or medical records (pick-up, mail, fax): FAX  If fax, what is the fax number:[phone]

## 2024-07-17 NOTE — TELEPHONE ENCOUNTER
Rx Refill Note  Requested Prescriptions     Pending Prescriptions Disp Refills    glipizide (GLUCOTROL XL) 5 MG ER tablet [Pharmacy Med Name: glipiZIDE ER 5 MG Oral Tablet Extended Release 24 Hour] 180 tablet 3     Sig: TAKE 1 TABLET BY MOUTH TWICE  DAILY      Last office visit with prescribing clinician: 6/28/2024   Last telemedicine visit with prescribing clinician: Visit date not found   Next office visit with prescribing clinician: 8/28/2024                         Would you like a call back once the refill request has been completed: [] Yes [] No    If the office needs to give you a call back, can they leave a voicemail: [] Yes [] No    Carl Kramer MA  07/17/24, 13:05 EDT

## 2024-08-19 ENCOUNTER — OFFICE VISIT (OUTPATIENT)
Dept: GASTROENTEROLOGY | Facility: CLINIC | Age: 69
End: 2024-08-19
Payer: MEDICARE

## 2024-08-19 VITALS
HEIGHT: 62 IN | HEART RATE: 72 BPM | SYSTOLIC BLOOD PRESSURE: 120 MMHG | WEIGHT: 179.1 LBS | DIASTOLIC BLOOD PRESSURE: 80 MMHG | TEMPERATURE: 96.7 F | OXYGEN SATURATION: 96 % | BODY MASS INDEX: 32.96 KG/M2

## 2024-08-19 DIAGNOSIS — K76.89 HEPATIC CYST: ICD-10-CM

## 2024-08-19 DIAGNOSIS — K21.00 GASTROESOPHAGEAL REFLUX DISEASE WITH ESOPHAGITIS WITHOUT HEMORRHAGE: ICD-10-CM

## 2024-08-19 DIAGNOSIS — R16.0 LIVER MASS: Primary | ICD-10-CM

## 2024-08-19 DIAGNOSIS — R74.8 ELEVATED LIVER ENZYMES: ICD-10-CM

## 2024-08-19 DIAGNOSIS — K74.69 OTHER CIRRHOSIS OF LIVER: ICD-10-CM

## 2024-08-19 PROCEDURE — 3079F DIAST BP 80-89 MM HG: CPT | Performed by: NURSE PRACTITIONER

## 2024-08-19 PROCEDURE — 1159F MED LIST DOCD IN RCRD: CPT | Performed by: NURSE PRACTITIONER

## 2024-08-19 PROCEDURE — 3074F SYST BP LT 130 MM HG: CPT | Performed by: NURSE PRACTITIONER

## 2024-08-19 PROCEDURE — 99214 OFFICE O/P EST MOD 30 MIN: CPT | Performed by: NURSE PRACTITIONER

## 2024-08-19 PROCEDURE — 1160F RVW MEDS BY RX/DR IN RCRD: CPT | Performed by: NURSE PRACTITIONER

## 2024-08-19 RX ORDER — PANTOPRAZOLE SODIUM 40 MG/1
40 TABLET, DELAYED RELEASE ORAL DAILY
Qty: 90 TABLET | Refills: 3 | Status: SHIPPED | OUTPATIENT
Start: 2024-08-19

## 2024-08-19 RX ORDER — DAPAGLIFLOZIN 5 MG/1
5 TABLET, FILM COATED ORAL DAILY
COMMUNITY
Start: 2024-08-19

## 2024-08-19 RX ORDER — CETIRIZINE HYDROCHLORIDE 10 MG/1
10 TABLET ORAL DAILY
COMMUNITY

## 2024-08-19 NOTE — PATIENT INSTRUCTIONS
History of colon polyps, colonoscopy recommended at 5-year interval, February 2029.    Continue daily acid medication, refill provided    Keep track/awareness of stomach sensation and drink/eat a small amount to see if this relieves sensation    Schedule MRI liver for monitoring, if stable, will monitor with ultrasounds if possible moving forward    Scheduling of your Ordered Diagnostic Tests : MRI     A team member from [facility] centralized scheduling department will contact you to schedule your tests.    You can also contact them directly at [phone].            Recommend AFP with next blood work for monitoring

## 2024-08-19 NOTE — PROGRESS NOTES
"Chief Complaint   Patient presents with    Follow-up     Review EGD and colonoscopy           History of Present Illness    69-year-old female presents today for follow-up after EGD and colonoscopy February 8 2024.    She has a history of cirrhosis, colon polyps, acid reflux, liver lesion, postprandial urgency and looser stools intermittently.  She has establish care with Dr. Zhang Hurd for liver cyst, last visit January 2024, plans for serial imaging of the cyst every 6 months.     She has intermittent abdominal discomfort that feels like a hunger feeling.   She has had symptoms for the past 2 weeks.   No nausea, no vomiting.     Acid reflux is controlled with pantoprazole    Result Review :       US Liver (12/22/2023 09:27)  COLONOSCOPY (02/08/2024 08:15)  UPPER GI ENDOSCOPY (02/08/2024 08:16)    Vital Signs:   /80   Pulse 72   Temp 96.7 °F (35.9 °C)   Ht 157.5 cm (62.01\")   Wt 81.2 kg (179 lb 1.6 oz)   SpO2 96%   BMI 32.75 kg/m²     Body mass index is 32.75 kg/m².     Physical Exam  Vitals reviewed.   Constitutional:       General: She is not in acute distress.     Appearance: Normal appearance. She is not ill-appearing or toxic-appearing.   Eyes:      General: No scleral icterus.  Pulmonary:      Effort: Pulmonary effort is normal. No respiratory distress.   Skin:     Coloration: Skin is not jaundiced.   Neurological:      Mental Status: She is alert and oriented to person, place, and time.   Psychiatric:         Mood and Affect: Mood normal.         Behavior: Behavior normal.         Thought Content: Thought content normal.         Judgment: Judgment normal.       Assessment and Plan    Diagnoses and all orders for this visit:    1. Liver mass (Primary)  -     MRI Abdomen With & Without Contrast; Future  -     AFP Tumor Marker    2. Gastroesophageal reflux disease with esophagitis without hemorrhage  -     pantoprazole (PROTONIX) 40 MG EC tablet; Take 1 tablet by mouth Daily.  Dispense: 90 tablet; " Refill: 3    3. Hepatic cyst  -     MRI Abdomen With & Without Contrast; Future  -     AFP Tumor Marker    4. Elevated liver enzymes  -     MRI Abdomen With & Without Contrast; Future  -     AFP Tumor Marker    5. Other cirrhosis of liver  -     AFP Tumor Marker         Reviewed EGD and colonoscopy  Gastritis negative for H. pylori, reflux esophagitis with esophageal Candida.  History of colon polyps, colonoscopy recommended at 5-year interval, February 2029.  Continue daily acid medication, refill provided.    Encourage patient to keep track of stomach sensation and drink or eat a small amount to see if this relieves sensation.    Schedule MRI liver for further monitoring, if this is stable, will try to monitor with ultrasounds if possible in the future.              Patient Instructions   History of colon polyps, colonoscopy recommended at 5-year interval, February 2029.    Continue daily acid medication, refill provided    Keep track/awareness of stomach sensation and drink/eat a small amount to see if this relieves sensation    Schedule MRI liver for monitoring, if stable, will monitor with ultrasounds if possible moving forward    Scheduling of your Ordered Diagnostic Tests : MRI     A team member from [facility] centralized scheduling department will contact you to schedule your tests.    You can also contact them directly at [phone].            Recommend AFP with next blood work for monitoring            EMR Dragon/Transcription Disclaimer:  This document has been Dictated utilizing Dragon dictation.

## 2024-09-15 ENCOUNTER — HOSPITAL ENCOUNTER (OUTPATIENT)
Dept: MRI IMAGING | Facility: HOSPITAL | Age: 69
Discharge: HOME | End: 2024-09-15
Admitting: NURSE PRACTITIONER
Payer: MEDICARE

## 2024-09-15 DIAGNOSIS — K76.89 HEPATIC CYST: ICD-10-CM

## 2024-09-15 DIAGNOSIS — R74.8 ELEVATED LIVER ENZYMES: ICD-10-CM

## 2024-09-15 DIAGNOSIS — R16.0 LIVER MASS: ICD-10-CM

## 2024-09-15 PROCEDURE — 0 GADOBENATE DIMEGLUMINE 529 MG/ML SOLUTION: Performed by: NURSE PRACTITIONER

## 2024-09-15 PROCEDURE — 74183 MRI ABD W/O CNTR FLWD CNTR: CPT

## 2024-09-15 PROCEDURE — A9577 INJ MULTIHANCE: HCPCS | Performed by: NURSE PRACTITIONER

## 2024-09-15 PROCEDURE — 82565 ASSAY OF CREATININE: CPT

## 2024-09-15 RX ADMIN — GADOBENATE DIMEGLUMINE 20 ML: 529 INJECTION, SOLUTION INTRAVENOUS at 10:41

## 2024-09-16 LAB — CREAT BLDA-MCNC: 0.7 MG/DL (ref 0.6–1.3)

## 2024-09-18 ENCOUNTER — PATIENT MESSAGE (OUTPATIENT)
Dept: GASTROENTEROLOGY | Facility: CLINIC | Age: 69
End: 2024-09-18
Payer: MEDICARE

## 2024-10-03 DIAGNOSIS — K76.89 HEPATIC CYST: ICD-10-CM

## 2024-10-03 DIAGNOSIS — K74.69 OTHER CIRRHOSIS OF LIVER: Primary | ICD-10-CM

## 2024-10-03 DIAGNOSIS — R16.0 LIVER MASS: ICD-10-CM

## 2025-01-31 ENCOUNTER — OFFICE VISIT (OUTPATIENT)
Dept: CARDIOLOGY | Facility: CLINIC | Age: 70
End: 2025-01-31
Payer: MEDICARE

## 2025-01-31 VITALS
HEART RATE: 65 BPM | HEIGHT: 62 IN | DIASTOLIC BLOOD PRESSURE: 76 MMHG | WEIGHT: 180 LBS | RESPIRATION RATE: 16 BRPM | SYSTOLIC BLOOD PRESSURE: 120 MMHG | BODY MASS INDEX: 33.13 KG/M2

## 2025-01-31 DIAGNOSIS — I10 ESSENTIAL HYPERTENSION: ICD-10-CM

## 2025-01-31 DIAGNOSIS — E78.2 MIXED HYPERLIPIDEMIA: ICD-10-CM

## 2025-01-31 DIAGNOSIS — R07.2 PRECORDIAL PAIN: Primary | ICD-10-CM

## 2025-01-31 RX ORDER — NITROFURANTOIN MACROCRYSTALS 100 MG/1
100 CAPSULE ORAL NIGHTLY
COMMUNITY
Start: 2024-12-18 | End: 2025-01-31

## 2025-01-31 RX ORDER — SULFAMETHOXAZOLE AND TRIMETHOPRIM 400; 80 MG/1; MG/1
1 TABLET ORAL 2 TIMES DAILY
COMMUNITY
Start: 2025-01-29

## 2025-01-31 NOTE — PROGRESS NOTES
[facility]      Patient Name: Cristiana Headley  :1955  Age: 69 y.o.  Encounter Provider:  Kaleb Bear Jr, MD      Chief Complaint: Follow-up chest discomfort      Chest Pain     Cristiana Headley is a 69 y.o. female past medical history of diabetes, hypertension, dyslipidemia who presents for follow-up evaluation of chest discomfort.     Last clinic visit note: Patient had 2 episodes of chest discomfort over the past few weeks that woke her from sleep.  She noted associated shortness of air.  She did notice a component of worsening with deep breaths.  She denied any associated nausea, vomiting, diaphoresis.  She has a history of palpitations after her COVID booster but that has been well controlled over the last year.  She did see a cardiologist in [place] at that time.  She just relocated to [place] in the last 4 months.  Blood pressure and heart rate are well controlled today.  She has no family history of coronary artery disease.  She is a lifelong non-smoker denies alcohol or illicit drug use.  She does have a history of total hip replacement which limits her physical activity and has been noticing exertional dyspnea over the last few months.    After last visit we ordered a stress study that was negative for ischemia.  Patient has been doing well since that time.  Blood pressure and heart rate are well-controlled today in clinic.  Patient denies angina.  No orthopnea, PND or edema.  No palpitations, dizziness syncope.  No cardiac complaints at time of interview.    The following portions of the patient's history were reviewed and updated as appropriate: allergies, current medications, past family history, past medical history, past social history, past surgical history and problem list.      Review of Systems   Cardiovascular:  Positive for chest pain.       OBJECTIVE:   Vital Signs  Vitals:    25 1132   BP: 120/76   Pulse: 65   Resp: 16     Estimated body mass index  "is 32.92 kg/m² as calculated from the following:    Height as of this encounter: 157.5 cm (62\").    Weight as of this encounter: 81.6 kg (180 lb).    Vitals reviewed.   Constitutional:       Appearance: Healthy appearance. Not in distress.   Neck:      Vascular: No JVR. JVD normal.   Pulmonary:      Effort: Pulmonary effort is normal.      Breath sounds: Normal breath sounds. No wheezing. No rhonchi. No rales.   Chest:      Chest wall: Not tender to palpatation.   Cardiovascular:      PMI at left midclavicular line. Normal rate. Regular rhythm. Normal S1. Normal S2.       Murmurs: There is no murmur.      No gallop.  No click. No rub.   Pulses:     Intact distal pulses.   Edema:     Peripheral edema absent.   Abdominal:      General: Bowel sounds are normal.      Palpations: Abdomen is soft.      Tenderness: There is no abdominal tenderness.   Musculoskeletal: Normal range of motion.         General: No tenderness. Skin:     General: Skin is warm and dry.   Neurological:      General: No focal deficit present.      Mental Status: Alert and oriented to person, place and time.       Procedures         BUN   Date Value Ref Range Status   12/12/2023 14 8 - 27 mg/dL Final   04/22/2022 11 8 - 23 mg/dL Final     Creatinine   Date Value Ref Range Status   09/15/2024 0.70 0.60 - 1.30 mg/dL Final     Comment:     Serial Number: [id number]:  [id number]     Potassium   Date Value Ref Range Status   12/12/2023 4.8 3.5 - 5.2 mmol/L Final   04/22/2022 4.2 3.5 - 5.2 mmol/L Final     ALT (SGPT)   Date Value Ref Range Status   12/12/2023 58 (H) 0 - 32 IU/L Final     AST (SGOT)   Date Value Ref Range Status   12/12/2023 92 (H) 0 - 40 IU/L Final           ASSESSMENT:     68-year-old female past medical history of diabetes, hypertension, dyslipidemia presents for evaluation of precordial pain      PLAN OF CARE:     Precordial pain -no further chest pain.  Increasing activity to meet weight loss goals.  LDL seems well-controlled for " primary mixed hyperlipidemia goals.  Essential hypertension -well-controlled today in clinic.  Continue twice daily blood pressure log and sodium restricted diet.  Mixed hyperlipidemia  Diabetes    Return to clinic 12 months           Discharge Medications            Accurate as of January 31, 2025 12:23 PM. If you have any questions, ask your nurse or doctor.                Changes to Medications        Instructions Start Date   glipizide 5 MG ER tablet  Commonly known as: GLUCOTROL XL  What changed: additional instructions   5 mg, Oral, 2 Times Daily             Continue These Medications        Instructions Start Date   cetirizine 10 MG tablet  Commonly known as: zyrTEC   10 mg, Daily      ezetimibe 10 MG tablet  Commonly known as: Zetia   10 mg, Oral, Daily      losartan 50 MG tablet  Commonly known as: COZAAR   50 mg, Oral, Daily      metFORMIN 500 MG tablet  Commonly known as: GLUCOPHAGE   500 mg, 2 Times Daily With Meals      sulfamethoxazole-trimethoprim 400-80 MG tablet  Commonly known as: BACTRIM,SEPTRA   1 tablet, 2 Times Daily      Tirzepatide 15 MG/0.5ML solution auto-injector   Inject  under the skin into the appropriate area as directed.               Thank you for allowing me to participate in the care of your patient,      Sincerely,   Kaleb Bear MD  [facility]  01/31/25  12:23 EST

## 2025-07-22 DIAGNOSIS — K21.00 GASTROESOPHAGEAL REFLUX DISEASE WITH ESOPHAGITIS WITHOUT HEMORRHAGE: ICD-10-CM

## 2025-07-23 RX ORDER — PANTOPRAZOLE SODIUM 40 MG/1
40 TABLET, DELAYED RELEASE ORAL DAILY
Qty: 90 TABLET | Refills: 3 | Status: SHIPPED | OUTPATIENT
Start: 2025-07-23

## (undated) DEVICE — JACKT LAB F/R KNIT CUFF/COLR XLG BLU

## (undated) DEVICE — URETERAL DILATATION SYSTEM

## (undated) DEVICE — PK URETSCP 40

## (undated) DEVICE — LINER CANSTR SXN MEDIVAC FLX/ADV 1500ML

## (undated) DEVICE — LAB CORP CLOTEST UREASE

## (undated) DEVICE — ADAPT CLN SCPE ENDO PORPOISE BX/50 DISP

## (undated) DEVICE — Device

## (undated) DEVICE — BITEBLOCK OMNI BLOC

## (undated) DEVICE — GLV SURG BIOGEL LTX PF 7

## (undated) DEVICE — STONE RETRIEVAL BASKET: Brand: SEGURA HEMISPHERE

## (undated) DEVICE — VIAL FORMLN CAP 10PCT 20ML

## (undated) DEVICE — KT ORCA ORCAPOD DISP STRL

## (undated) DEVICE — CATH URETRL FLXITP POLLACK STD 5F 70CM

## (undated) DEVICE — TIDISHIELD UROLOGY DRAIN BAGS FROSTY VINYL STERILE FITS SIEMENS UROSKOP ACCESS 20 PER CASE: Brand: TIDISHIELD

## (undated) DEVICE — PRT BIOP SEALS

## (undated) DEVICE — FRCP BX RADJAW4 NDL 2.8 240CM LG OG BX80

## (undated) DEVICE — GOWN SURG SIRUS NONREINF W/SLV 3XL STRL

## (undated) DEVICE — CANN O2 ETCO2 FITS ALL CONN CO2 SMPL A/ 7IN DISP LF